# Patient Record
Sex: MALE | Race: WHITE | URBAN - METROPOLITAN AREA
[De-identification: names, ages, dates, MRNs, and addresses within clinical notes are randomized per-mention and may not be internally consistent; named-entity substitution may affect disease eponyms.]

---

## 2017-10-09 NOTE — TELEPHONE ENCOUNTER
APPT INFORMATION    Date & Time:  11/10/17   Reason for Appt:  sig health    Referring Name/Clinic:  none    Yes / No COMMENT / NOTES DATE & ACTION   Patient Contacted?  no         RECORDS CLINIC NAME DATE & ACTION RECEIVED RECS & IMG? Y/N   External Clinics:  [facility] [place]  faxed request

## 2017-10-19 NOTE — TELEPHONE ENCOUNTER
Note:    Records received from [facility], will forward to Ravinder.

## 2017-11-02 ASSESSMENT — PATIENT HEALTH QUESTIONNAIRE - PHQ9: SUM OF ALL RESPONSES TO PHQ QUESTIONS 1-9: 2

## 2017-11-02 ASSESSMENT — ANXIETY QUESTIONNAIRES
5. BEING SO RESTLESS THAT IT IS HARD TO SIT STILL: NOT AT ALL
6. BECOMING EASILY ANNOYED OR IRRITABLE: NOT AT ALL
1. FEELING NERVOUS, ANXIOUS, OR ON EDGE: NOT AT ALL
2. NOT BEING ABLE TO STOP OR CONTROL WORRYING: NOT AT ALL
GAD7 TOTAL SCORE: 0
4. TROUBLE RELAXING: NOT AT ALL
GAD7 TOTAL SCORE: 0
7. FEELING AFRAID AS IF SOMETHING AWFUL MIGHT HAPPEN: NOT AT ALL
7. FEELING AFRAID AS IF SOMETHING AWFUL MIGHT HAPPEN: NOT AT ALL
3. WORRYING TOO MUCH ABOUT DIFFERENT THINGS: NOT AT ALL

## 2017-11-03 RX ORDER — ATORVASTATIN CALCIUM 10 MG/1
10 TABLET, FILM COATED ORAL DAILY
COMMUNITY
End: 2017-11-10

## 2017-11-03 RX ORDER — MULTIPLE VITAMINS W/ MINERALS TAB 9MG-400MCG
1 TAB ORAL DAILY
COMMUNITY

## 2017-11-03 RX ORDER — COVID-19 ANTIGEN TEST
220 KIT MISCELLANEOUS 2 TIMES DAILY WITH MEALS
COMMUNITY
End: 2017-11-10

## 2017-11-03 RX ORDER — LOSARTAN POTASSIUM 50 MG/1
50 TABLET ORAL DAILY
COMMUNITY
End: 2017-11-10

## 2017-11-03 ASSESSMENT — ANXIETY QUESTIONNAIRES: GAD7 TOTAL SCORE: 0

## 2017-11-03 ASSESSMENT — PATIENT HEALTH QUESTIONNAIRE - PHQ9: SUM OF ALL RESPONSES TO PHQ QUESTIONS 1-9: 2

## 2017-11-03 NOTE — PATIENT INSTRUCTIONS
"    Discuss with your health care provider whether a prostate screening test is right for you.  Schedule colonoscopy at age 50 (earlier if you are -American; recommendations vary) unless there is a personal or family history of early colon cancer or colon polyps or a personal history of inflammatory bowel disease or abdominal radiation therapy; alternatives to colonoscopy can be discussed with your provider.   Arrange measurement of your total cholesterol and HDL readings beginning at age 25 if you use tobacco or have high blood pressure or a family history of heart disease; otherwise, screening can begin at age 35.   Schedule a fasting blood glucose level every 3 years between ages 40 and 70; individuals with high blood pressure, abnormal cholesterol readings, or overweight should be tested at an earlier age and possibly more frequently.   Immunizations: Influenza vaccination (flu shot) is given annually; tetanus, diphtheria and pertussis (Tdap) vaccination should be given once, after which tetanus and diphtheria (Td) vaccination should be given every 10 years; zoster (shingles) vaccination should be given once after age 60; PCV13 vaccination should be administered at age 65, at least one year after PPSV23; PPSV23 should be administered at least one year after PCV 13.   Eat plenty of vegetables and fruits, avoid all consumption of \"trans-\" fats, limit consumption of salt and sodium, saturated fat, sugar and starches, consume only whole grains, in moderation, such as whole wheat pasta, brown rice, and whole-grain breads, limit red meat intake, and include monounsaturated (for example olive oil, almonds, and avocados) and omega-3 fats (for example walnuts, flaxseed, and cold water, oily fish such as salmon, light tuna, trout, and sardines) in your diet.    For bone health, eat calcium-rich foods throughout each day to achieve a total daily intake of 1000 mg of calcium; take at least 1000 international units " daily of a vitamin D3 supplement.    Aim for 150 minutes per week of exercise and activity (30 minutes, most days of the week) to help control weight and prevent disease.   Limit use of alcohol to two servings per day.   Avoid all use of tobacco.   Apply generous amounts of sunscreen and reapply frequently to reduce the likelihood of developing skin cancer.   See your dentist twice per year for examination and cleaning.   Visit your eye doctor every 1-2 years or as directed.   Arrange testing for sexually transmitted infections at least annually if you are at increased risk: HIV-infected, entering correctional facility, multiple anonymous partners, intravenous drug use, engaging in sex in conjunction with illicit drug use, or a man who has sex with men.

## 2017-11-03 NOTE — PROGRESS NOTES
"Family History:  Do you have Ashkenazi Sabianist heritage? No    Health Maintenance:  (if not already asked), Do you have a PCP? Yes  When was your last visit with your PCP? 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 Nov. 2016    Have you ever had a colonoscopy? Yes, Nov. 2016  Have you ever had any polyps removed? Yes, benign - recommended to have repeat Colonoscopy     Pre visit Preparation:     Goals for Visit:  1. Thorough physical, check PSA  2. Discussion related to concerns with L ear \"stuffiness' that has been persistent for several months, combined with 2 colds since April, sinus and green mucus, no antibiotics, he noted he is a frequent flyer.  3. Pt did not report other symptoms, however pt reported ROS questionnaire does include urinary frequency, congestion and hoarseness.    Generally in good health, positive marriage of 38 years, two children 34 year daughter and 32 year old son, in good health  [employer] for work and pleasure  Exercises 4 times per week - cardio and weights    Jennifer Conroy PhD, RN, [facility]      [facility] Visit:     Full day appointment includes laboratory panel, EKG, Vital Signs, Spirometry, Full body skin exam, body composition assessment, diet assessment, nutrition consult, eye exam, fitness evaluation with exercise physiologist, hearing test, complete physical exam with review of test results and plan of care.     As I mentioned as part of your visit we will set up a DEXA scan which will measure your body composition. We have a few questions that need to be answered before we can schedule this scan:   What is your approximate weight? Did not obtain     Have you ever had a DEXA scan within the past 2 years? Yes [place] 2016   Will you have any other imaging studies with contrast (x-ray, CT scan) within 7 days of this appointment? No     Have you had any spine or hip surgery? No     Do you take any vitamins that contain calcium or antacids with " calcium? Yes Multi vitamin    If yes, stop taking 24 hours prior to visit.       Instructions prior to appointment:   1. Fast beginning at 10 pm for lab appointment  2. If your preventive care assessment package includes a Fitness Assessment, please bring athletic shoes. Complementary [facility] fitness attire is provided and yours to keep.  3. If eye exam, eyes may be dilated, it will last 4-6 hours, may want to bring sunglasses.   4. May bring laptop or other work materials for use during downtime.     Breakfast/Lunch Provided, Special Dietary Restrictions NO    Complimentary  Parking provided. Drop off car in front of [facility], enter  Lobby and identify yourself as a Signature Patient to one of the ambassadors ([email or web address]). One of our nurses will meet you in the lobby and escort you to our clinic.     Concerns with Privacy? NO    When you enter in the lobby, identify yourself as a [facility] Patient and you will be escorted up to the clinic. If questions arise prior to your appointment please contact the clinic at [phone].    Next Appointment Scheduled? November 10, 2017    Answers for HPI/ROS submitted by the patient on 11/2/2017   If you checked off any problems, how difficult have these problems made it for you to do your work, take care of things at home, or get along with other people?: Somewhat difficult  PHQ9 TOTAL SCORE: 2  SAMANTHA 7 TOTAL SCORE: 0  General Symptoms: No  Skin Symptoms: No  HENT Symptoms: Yes  EYE SYMPTOMS: No  HEART SYMPTOMS: No  LUNG SYMPTOMS: No  INTESTINAL SYMPTOMS: No  URINARY SYMPTOMS: Yes  REPRODUCTIVE SYMPTOMS: No  SKELETAL SYMPTOMS: No  BLOOD SYMPTOMS: No  NERVOUS SYSTEM SYMPTOMS: No  MENTAL HEALTH SYMPTOMS: No  Ear pain: Yes  Ear discharge: No  Hearing loss: No  Tinnitus: No  Nosebleeds: No  Congestion: Yes  Trouble swallowing: No   Voice hoarseness: Yes  Mouth sores: No  Sore throat: No  Tooth pain: No  Gum tenderness: No  Bleeding gums: No  Change in  taste: No  Change in sense of smell: No  Dry mouth: No  Neck lump: No  Trouble holding urine or incontinence: No  Pain or burning: Yes  Trouble starting or stopping: No  Increased frequency of urination: Yes  Blood in urine: No  Decreased frequency of urination: No  Frequent nighttime urination: Yes  Flank pain: No  Difficulty emptying bladder: No

## 2017-11-10 ENCOUNTER — ALLIED HEALTH/NURSE VISIT (OUTPATIENT)
Dept: INTERNAL MEDICINE | Facility: CLINIC | Age: 62
End: 2017-11-10

## 2017-11-10 ENCOUNTER — OFFICE VISIT (OUTPATIENT)
Dept: AUDIOLOGY | Facility: CLINIC | Age: 62
End: 2017-11-10

## 2017-11-10 ENCOUNTER — OFFICE VISIT (OUTPATIENT)
Dept: INTERNAL MEDICINE | Facility: CLINIC | Age: 62
End: 2017-11-10

## 2017-11-10 ENCOUNTER — OFFICE VISIT (OUTPATIENT)
Dept: OPHTHALMOLOGY | Facility: CLINIC | Age: 62
End: 2017-11-10

## 2017-11-10 ENCOUNTER — OFFICE VISIT (OUTPATIENT)
Dept: DERMATOLOGY | Facility: CLINIC | Age: 62
End: 2017-11-10

## 2017-11-10 ENCOUNTER — PRE VISIT (OUTPATIENT)
Dept: INTERNAL MEDICINE | Facility: CLINIC | Age: 62
End: 2017-11-10

## 2017-11-10 VITALS
RESPIRATION RATE: 16 BRPM | HEIGHT: 68 IN | BODY MASS INDEX: 27.58 KG/M2 | TEMPERATURE: 98.8 F | HEART RATE: 70 BPM | WEIGHT: 182 LBS | DIASTOLIC BLOOD PRESSURE: 84 MMHG | SYSTOLIC BLOOD PRESSURE: 143 MMHG | OXYGEN SATURATION: 96 %

## 2017-11-10 DIAGNOSIS — Z11.59 NEED FOR HEPATITIS C SCREENING TEST: ICD-10-CM

## 2017-11-10 DIAGNOSIS — Z00.00 VISIT FOR PREVENTIVE HEALTH EXAMINATION: ICD-10-CM

## 2017-11-10 DIAGNOSIS — L57.0 AK (ACTINIC KERATOSIS): Primary | ICD-10-CM

## 2017-11-10 DIAGNOSIS — R97.20 ELEVATED PROSTATE SPECIFIC ANTIGEN (PSA): ICD-10-CM

## 2017-11-10 DIAGNOSIS — Z00.00 ROUTINE GENERAL MEDICAL EXAMINATION AT A HEALTH CARE FACILITY: Primary | ICD-10-CM

## 2017-11-10 DIAGNOSIS — E78.49 OTHER HYPERLIPIDEMIA: ICD-10-CM

## 2017-11-10 DIAGNOSIS — H25.13 NUCLEAR SENILE CATARACT OF BOTH EYES: ICD-10-CM

## 2017-11-10 DIAGNOSIS — Z00.00 ENCOUNTER FOR PREVENTIVE HEALTH EXAMINATION: Primary | ICD-10-CM

## 2017-11-10 DIAGNOSIS — D48.5 NEOPLASM OF UNCERTAIN BEHAVIOR OF SKIN: ICD-10-CM

## 2017-11-10 DIAGNOSIS — Z77.122 HISTORY OF EXPOSURE TO NOISE: ICD-10-CM

## 2017-11-10 DIAGNOSIS — T50.905S ADVERSE EFFECT OF DRUG, SEQUELA: ICD-10-CM

## 2017-11-10 DIAGNOSIS — Z04.9 NO DISEASE FOUND: Primary | ICD-10-CM

## 2017-11-10 DIAGNOSIS — N40.0 BENIGN PROSTATIC HYPERPLASIA, UNSPECIFIED WHETHER LOWER URINARY TRACT SYMPTOMS PRESENT: ICD-10-CM

## 2017-11-10 DIAGNOSIS — L81.4 SOLAR LENTIGINOSIS: ICD-10-CM

## 2017-11-10 DIAGNOSIS — Z01.10 ENCOUNTER FOR AUDIOLOGY EVALUATION: Primary | ICD-10-CM

## 2017-11-10 DIAGNOSIS — H93.8X2 SENSATION OF FULLNESS IN LEFT EAR: ICD-10-CM

## 2017-11-10 DIAGNOSIS — I10 BENIGN ESSENTIAL HYPERTENSION: ICD-10-CM

## 2017-11-10 DIAGNOSIS — H52.03 HYPERMETROPIA OF BOTH EYES: Primary | ICD-10-CM

## 2017-11-10 DIAGNOSIS — M85.9 LOW BONE DENSITY: ICD-10-CM

## 2017-11-10 LAB
ALBUMIN UR-MCNC: NEGATIVE MG/DL
ALP SERPL-CCNC: 64 U/L (ref 40–150)
ALT SERPL W P-5'-P-CCNC: 28 U/L (ref 0–70)
APPEARANCE UR: CLEAR
BASOPHILS # BLD AUTO: 0.1 10E9/L (ref 0–0.2)
BASOPHILS NFR BLD AUTO: 0.6 %
BILIRUB UR QL STRIP: NEGATIVE
CHOLEST SERPL-MCNC: 126 MG/DL
COLOR UR AUTO: YELLOW
CREAT SERPL-MCNC: 0.82 MG/DL (ref 0.66–1.25)
DEPRECATED CALCIDIOL+CALCIFEROL SERPL-MC: 44 UG/L (ref 20–75)
DIFFERENTIAL METHOD BLD: NORMAL
EOSINOPHIL # BLD AUTO: 0.2 10E9/L (ref 0–0.7)
EOSINOPHIL NFR BLD AUTO: 2.1 %
ERYTHROCYTE [DISTWIDTH] IN BLOOD BY AUTOMATED COUNT: 12.4 % (ref 10–15)
EXPTIME-PRE: 6.72 SEC
FEF2575-%PRED-PRE: 77 %
FEF2575-PRE: 2.03 L/SEC
FEF2575-PRED: 2.63 L/SEC
FEFMAX-%PRED-PRE: 113 %
FEFMAX-PRE: 9.54 L/SEC
FEFMAX-PRED: 8.41 L/SEC
FEV1-%PRED-PRE: 83 %
FEV1-PRE: 2.66 L
FEV1FEV6-PRE: 77 %
FEV1FEV6-PRED: 79 %
FEV1FVC-PRE: 76 %
FEV1FVC-PRED: 77 %
FIFMAX-PRE: 7.05 L/SEC
FVC-%PRED-PRE: 85 %
FVC-PRE: 3.51 L
FVC-PRED: 4.13 L
GFR SERPL CREATININE-BSD FRML MDRD: >90 ML/MIN/1.7M2
GLUCOSE SERPL-MCNC: 99 MG/DL (ref 70–99)
GLUCOSE UR STRIP-MCNC: NEGATIVE MG/DL
HCT VFR BLD AUTO: 43.4 % (ref 40–53)
HCV AB SERPL QL IA: NONREACTIVE
HDLC SERPL-MCNC: 58 MG/DL
HGB BLD-MCNC: 14.4 G/DL (ref 13.3–17.7)
HGB UR QL STRIP: NEGATIVE
HIV 1+2 AB+HIV1 P24 AG SERPL QL IA: NONREACTIVE
IMM GRANULOCYTES # BLD: 0 10E9/L (ref 0–0.4)
IMM GRANULOCYTES NFR BLD: 0.3 %
KETONES UR STRIP-MCNC: NEGATIVE MG/DL
LDLC SERPL CALC-MCNC: 54 MG/DL
LEUKOCYTE ESTERASE UR QL STRIP: NEGATIVE
LYMPHOCYTES # BLD AUTO: 1.5 10E9/L (ref 0.8–5.3)
LYMPHOCYTES NFR BLD AUTO: 16.9 %
MCH RBC QN AUTO: 31.2 PG (ref 26.5–33)
MCHC RBC AUTO-ENTMCNC: 33.2 G/DL (ref 31.5–36.5)
MCV RBC AUTO: 94 FL (ref 78–100)
MONOCYTES # BLD AUTO: 0.9 10E9/L (ref 0–1.3)
MONOCYTES NFR BLD AUTO: 9.7 %
MUCOUS THREADS #/AREA URNS LPF: PRESENT /LPF
NEUTROPHILS # BLD AUTO: 6.3 10E9/L (ref 1.6–8.3)
NEUTROPHILS NFR BLD AUTO: 70.4 %
NITRATE UR QL: NEGATIVE
NONHDLC SERPL-MCNC: 67 MG/DL
NRBC # BLD AUTO: 0 10*3/UL
NRBC BLD AUTO-RTO: 0 /100
PH UR STRIP: 5 PH (ref 5–7)
PLATELET # BLD AUTO: 281 10E9/L (ref 150–450)
POTASSIUM SERPL-SCNC: 4.3 MMOL/L (ref 3.4–5.3)
PSA SERPL-ACNC: 4.34 UG/L (ref 0–4)
RBC # BLD AUTO: 4.62 10E12/L (ref 4.4–5.9)
RBC #/AREA URNS AUTO: 1 /HPF (ref 0–2)
SOURCE: ABNORMAL
SP GR UR STRIP: 1.02 (ref 1–1.03)
TRIGL SERPL-MCNC: 66 MG/DL
TSH SERPL DL<=0.005 MIU/L-ACNC: 1.83 MU/L (ref 0.4–4)
UROBILINOGEN UR STRIP-MCNC: 0 MG/DL (ref 0–2)
WBC # BLD AUTO: 9 10E9/L (ref 4–11)
WBC #/AREA URNS AUTO: 1 /HPF (ref 0–2)

## 2017-11-10 RX ORDER — TAMSULOSIN HYDROCHLORIDE 0.4 MG/1
0.4 CAPSULE ORAL DAILY
Qty: 90 CAPSULE | Refills: 3 | Status: SHIPPED | OUTPATIENT
Start: 2017-11-10 | End: 2018-12-12

## 2017-11-10 RX ORDER — LOSARTAN POTASSIUM 50 MG/1
50 TABLET ORAL DAILY
Qty: 90 TABLET | Refills: 3 | Status: SHIPPED | OUTPATIENT
Start: 2017-11-10 | End: 2018-12-06

## 2017-11-10 RX ORDER — ATORVASTATIN CALCIUM 10 MG/1
10 TABLET, FILM COATED ORAL DAILY
Qty: 90 TABLET | Refills: 3 | Status: SHIPPED | OUTPATIENT
Start: 2017-11-10 | End: 2018-12-12

## 2017-11-10 RX ORDER — FLUOROURACIL 50 MG/G
CREAM TOPICAL
Qty: 40 G | Refills: 1 | Status: SHIPPED | OUTPATIENT
Start: 2017-11-10 | End: 2019-03-29

## 2017-11-10 ASSESSMENT — CONF VISUAL FIELD
OS_NORMAL: 1
OD_NORMAL: 1
METHOD: COUNTING FINGERS

## 2017-11-10 ASSESSMENT — SLIT LAMP EXAM - LIDS
COMMENTS: NORMAL
COMMENTS: NORMAL

## 2017-11-10 ASSESSMENT — REFRACTION_MANIFEST
OD_ADD: +2.00
OD_CYLINDER: +0.50
OD_SPHERE: +0.50
OS_CYLINDER: SPHERE
OS_SPHERE: +1.00
OS_ADD: +2.00
OD_AXIS: 180

## 2017-11-10 ASSESSMENT — VISUAL ACUITY
METHOD: SNELLEN - LINEAR
OS_SC: 20/25
OD_SC+: -2
OS_SC+: +
OD_SC: 20/20

## 2017-11-10 ASSESSMENT — TONOMETRY
OD_IOP_MMHG: 17
OS_IOP_MMHG: 20
IOP_METHOD: ICARE

## 2017-11-10 ASSESSMENT — REFRACTION_CURRENTRX
OS_BASECURVE: 8.5
OS_BRAND: 1-DAY ACUVUE MOIST
OD_BRAND: 1-DAY ACUVUE MOIST
OD_BASECURVE: 8.5
OS_DIAMETER: 14.2
OD_DIAMETER: 14.2
OD_SPHERE: +0.75
OS_SPHERE: +3.00

## 2017-11-10 ASSESSMENT — PAIN SCALES - GENERAL
PAINLEVEL: NO PAIN (0)
PAINLEVEL: NO PAIN (0)

## 2017-11-10 ASSESSMENT — EXTERNAL EXAM - RIGHT EYE: OD_EXAM: NORMAL

## 2017-11-10 ASSESSMENT — EXTERNAL EXAM - LEFT EYE: OS_EXAM: NORMAL

## 2017-11-10 ASSESSMENT — CUP TO DISC RATIO
OD_RATIO: 0.3
OS_RATIO: 0.3

## 2017-11-10 NOTE — MR AVS SNAPSHOT
After Visit Summary   11/10/2017    Driss Dillon    MRN: [id number]           Patient Information     Date Of Birth          1955        Visit Information        Provider Department      11/10/2017 8:40 AM Tommy Chamberlain, [facility]        Today's Diagnoses     Hypermetropia of both eyes    -  1    Nuclear senile cataract of both eyes           Follow-ups after your visit        Follow-up notes from your care team     Return in about 1 year (around 11/10/2018) for Comprehensive Eye Exam.      Future tests that were ordered for you today     Open Future Orders        Priority Expected Expires Ordered    Prostate spec antigen screen Routine 11/24/2017 1/10/2018 11/10/2017    UROLOGY ADULT REFERRAL Routine 12/10/2017 12/10/2017 11/10/2017            Who to contact     Please call your clinic at [phone] to:    Ask questions about your health    Make or cancel appointments    Discuss your medicines    Learn about your test results    Speak to your doctor   If you have compliments or concerns about an experience at your clinic, or if you wish to file a complaint, please contact [facility] Patient Relations at [phone] or email us at [email or web address]         Additional Information About Your Visit        MyChart Information     [employer] gives you secure access to your electronic health record. If you see a primary care provider, you can also send messages to your care team and make appointments. If you have questions, please call your primary care clinic.  If you do not have a primary care provider, please call [phone] and they will assist you.      [employer] is an electronic gateway that provides easy, online access to your medical records. With [employer], you can request a clinic appointment, read your test results, renew a prescription or communicate with your care team.     To access your existing account, please contact your  [facility] or call [phone] for assistance.        Care EveryWhere ID     This is your Care EveryWhere ID. This could be used by other organizations to access your [facility] medical records  [id number]         Blood Pressure from Last 3 Encounters:   11/10/17 143/84    Weight from Last 3 Encounters:   11/10/17 82.6 kg (182 lb)              We Performed the Following     HC CONTACT LENS FITTING COSMETIC LVL 2 (66017.012)     REFRACTION [96464]          Today's Medication Changes          These changes are accurate as of: 11/10/17  4:29 PM.  If you have any questions, ask your nurse or doctor.               Start taking these medicines.        Dose/Directions    fluorouracil 5 % cream   Commonly known as:  EFUDEX   Used for:  AK (actinic keratosis)   Started by:  Elen Hensley MD        To forehead, ears, cheeks, nose nightly x3 weeks.   Quantity:  40 g   Refills:  1       tamsulosin 0.4 MG capsule   Commonly known as:  FLOMAX   Used for:  Benign prostatic hyperplasia, unspecified whether lower urinary tract symptoms present   Started by:  Dylan Fragoso MD        Dose:  0.4 mg   Take 1 capsule (0.4 mg) by mouth daily   Quantity:  90 capsule   Refills:  3         Stop taking these medicines if you haven't already. Please contact your care team if you have questions.     ALEVE 220 MG capsule   Generic drug:  naproxen sodium   Stopped by:  Dylan Fragoso MD                Where to get your medicines      These medications were sent to [facility] [id number] [place] [address] & [address] [address]     Phone:  [phone]     atorvastatin 10 MG tablet    losartan 50 MG tablet    tamsulosin 0.4 MG capsule         These medications were sent to [facility] [address] [facility] [place] - [address] & [facility]  [address] [place] [address] [place] [address]    Hours:  24-hours Phone:   [phone]     fluorouracil 5 % cream                Primary Care Provider    None Specified       No primary provider on file.        Equal Access to Services     TAYLOR SHARPE : Hadii nikolai bell amirah Haas, alexada nickquan, savita shukla aroldonelda, arturo jeremiahin hayaabobby kendrickcarlos carrillo clarence medina. So [facility] [phone].    ATENCIÓN: Si habla español, tiene a khoury disposición servicios gratuitos de asistencia lingüística. Llame al [phone].    We comply with applicable federal civil rights laws and [place] laws. We do not discriminate on the basis of race, color, national origin, age, disability, sex, sexual orientation, or gender identity.            Thank you!     Thank you for choosing [facility] OPHTH[facility]  for your care. Our goal is always to provide you with excellent care. Hearing back from our patients is one way we can continue to improve our services. Please take a few minutes to complete the written survey that you may receive in the mail after your visit with us. Thank you!             Your Updated Medication List - Protect others around you: Learn how to safely use, store and throw away your medicines at www.disposemymeds.org.          This list is accurate as of: 11/10/17  4:29 PM.  Always use your most recent med list.                   Brand Name Dispense Instructions for use Diagnosis    aspirin 81 MG tablet      Take 81 mg by mouth daily        atorvastatin 10 MG tablet    LIPITOR    90 tablet    Take 1 tablet (10 mg) by mouth daily    Other hyperlipidemia       fluorouracil 5 % cream    EFUDEX    40 g    To forehead, ears, cheeks, nose nightly x3 weeks.    AK (actinic keratosis)       losartan 50 MG tablet    COZAAR    90 tablet    Take 1 tablet (50 mg) by mouth daily    Benign essential hypertension       Multi-vitamin Tabs tablet      Take 1 tablet by mouth daily        tamsulosin 0.4 MG capsule    FLOMAX    90 capsule    Take 1 capsule (0.4 mg) by mouth daily    Benign prostatic hyperplasia,  unspecified whether lower urinary tract symptoms present

## 2017-11-10 NOTE — PROGRESS NOTES
Assessment/Plan  (H52.03) Hypermetropia of both eyes  (primary encounter diagnosis)  Comment: Hyperopia with presbyopia OU, noting blur with current lenses  Plan:  CONTACT LENS FITTING COSMETIC LVL 2 (95641.012), REFRACTION [[id number]]         Educated patient on condition and clinical findings. Dispensed spectacle prescription for full time wear. Educated patient on possibility of adaptation period, if symptoms do not improve return to clinic for further testing.   Dispensed trials and finalized contact lens prescription for Acuvue 1-Day Moist lenses.    (H25.13) Nuclear senile cataract of both eyes  Comment: Not visually significant.  Plan:  Monitor annually.    Contact Lens Billing  V-Code:  - Soft spherical  Final Contact Lens Rx      Brand Base Curve Diameter Sphere   Right 1-Day Acuvue Moist 8.5 14.2 +0.75   Left 1-Day Acuvue Moist 8.5 14.2 +3.00       Expiration Date:  11/11/2019    Replacement:  Daily    Wearing Schedule:  Daily wear         CL Fitting Fee: $100    These are for cosmetic contact lenses.    Encounter Diagnoses   Name Primary?     Hypermetropia of both eyes Yes     Nuclear senile cataract of both eyes         Complete documentation of historical and exam elements from today's encounter can  be found in the full encounter summary report (not reduplicated in this progress  note). I personally obtained the chief complaint(s) and history of present illness. I  confirmed and edited as necessary the review of systems, past medical/surgical  history, family history, social history, and examination findings as documented by  others; and I examined the patient myself. I personally reviewed the relevant tests,  images, and reports as documented above. I formulated and edited as necessary the  assessment and plan and discussed the findings and management plan with the  patient and family.    Tommy Chamberlain, PATRICIA, FAAO

## 2017-11-10 NOTE — PATIENT INSTRUCTIONS

## 2017-11-10 NOTE — OUTPATIENT NURSE NOTE
"   11/10/17 1106   Fitness   Current Fitness Regimen:  4x/wk of cycling at moderate and vigorous intensity for 52 min. 4x/week of upper body lifting (chest flies, rows, lat pull, shoulder press, biceps curls, triceps ext, etc)   Fitness Goals Maintain current workout regimen. Add 10-15 minutes of jogging/running once per week.   Timeline   Recommended Activity this Week Jogging/running   Recommended Minutes per Day this Week 15 Min   Recommended Number of Days this Week 1 Per Day/Per Week   Recommended Activity this Month Drop one day of cycling and add one day of jogging/running for 25-30min per week.   Recommended Duration this Month 30 Min/Hrs   Recommended Frequency this Month 1 Per Day/Per Week   Recommended Activity the Nex 3 Months Continue with 3x/wk of cycling, 4x/wk of upper body lifting, add one day of running per week. Reach that 60 minute mikala of jogging/running. Keep up the good work otherwise!   Recommended Duration the Nex 3 Months 60 Min/Hrs   Recommended Frequency the Nex 3 Months:  1 Per Day/Per Week   VO2 Max   VO2MAX:  38.2 ml/kg/min   VO2- max Percentile 70 %   Fitness Level Good    Strength    Strength (Right):  93.4 ml/kg/min    Strength (Left) 77.1 ml/kg/min     HR Zone 2: 115-130  HR Zone 3: 130-145  HR Zone 4/5: 150+    Try to push into Zone 4/5 for the \"work set\" of any intervals, recover in Zone 2. For normal biking, try to stay in Zone 2/3. Jogging/Running you'll want to push into Zone 3.    Sekou Solorzano M.S., PhDc      "

## 2017-11-10 NOTE — LETTER
Date:November 13, 2017      Patient was self referred, no letter generated. Do not send.        [facility] Information

## 2017-11-10 NOTE — NURSING NOTE
Driss Dillon comes into clinic today at the request of Dr. OPAL Fragoso Ordering Provider for EKG.    This service provided today was under the supervising provider of the day Dr. OPAL Fragoso, who was available if needed.    Ashley Chaudhary

## 2017-11-10 NOTE — LETTER
11/10/2017       RE: Driss Dillon  [address]  [place] [address]     Dear Colleague,    Thank you for referring your patient, Driss Dillon, to the [facility] D[facility] at [facility]. Please see a copy of my visit note below.    DERMATOLOGY CLINIC VISIT NOTE      CHIEF COMPLAINT:  Skin check for [facility].      DERMATOLOGY PROBLEM LIST:   1.  History of basal cell carcinoma in 1990s in [place].   2.  History of squamous cell carcinoma in 1990s in [place].   3.  History of actinic keratoses, status post Carac treatment in [place] x5 days in 2016.   4.  Actinic keratoses, beginning treatment with Efudex x3 weeks in 11/2017.   5.  Solar lentigines.   6.  Neoplasm of uncertain behavior x3 biopsied on 11/10/2017, located on the right lateral neck, left  flank superior, left flank inferior.  Superior lesion concerning for basal cell, neck lesion concerning for basal cell, inferior lesion concerning for dysplastic nevus versus malignant melanoma.      HISTORY OF PRESENT ILLNESS:  Mr. Dillon is a 62-year-old male presenting to Dermatology Clinic for skin check as part of the [facility] Program.  He is new to our clinic.  He has a past history of nonmelanoma skin cancer as noted above.  He notes that it has been approximately eighteen months since last seen by a dermatologist.  He previously had been followed in [place] for his skin.  Denies any new or changing spots today.      Patient Active Problem List   Diagnosis     AK (actinic keratosis)       No Known Allergies      Current Outpatient Prescriptions   Medication     fluorouracil (EFUDEX) 5 % cream     multivitamin, therapeutic with minerals (MULTI-VITAMIN) TABS tablet     tamsulosin (FLOMAX) 80 mcg/mL SUSP     aspirin 81 MG tablet     atorvastatin (LIPITOR) 10 MG tablet     losartan (COZAAR) 50 MG tablet     naproxen sodium (ALEVE) 220 MG capsule     No current facility-administered medications  for this visit.            SOCIAL HISTORY:  Lives with his wife in [place].  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      FAMILY HISTORY:  Mother with nonmelanoma skin cancer.      REVIEW OF SYSTEMS:  The patient feels well without additional skin concerns today.      PHYSICAL EXAMINATION:   There were no vitals taken for this visit.    GENERAL:  The patient is a healthy-appearing 62-year-old male in no distress.   HEENT:  Conjunctivae are clear.   PULMONARY:  Breathing comfortably on room air.   ABDOMEN:  No abdominal distention.   SKIN:  Examination today included the scalp, face, neck, chest, abdomen, back, arms, legs, hands, feet, buttocks.  Skin exam was normal except for as follows:   -- Scalp with scattered erosions, 1 mm with yellow crusting.   -- Examination of the forehead, lateral cheeks, superior helices, nasal dorsum and central cheeks with greater than 20 gritty papules.   -- Examination of the left superior flank with an 8 mm, pink, very slightly raised glassy papule.   -- Atrophic, pink, triangular macule on right lateral neck.   -- Blue, black 3 mm macule on left flank inferior.   -- Extensive light brown, 1-3 mm macules across the shoulders, upper and lower back, chest, abdomen, legs.   -- Mild xerosis of arms and legs.   -- Surgical scar on right upper forehead.      PROCEDURE NOTE:  The patient was consented to 2 shave biopsies on the right lateral neck and left flank superior and 1 punch biopsy on the left flank inferior.  Areas were infiltrated with 3 mL of lidocaine with epinephrine.  A Wyatt blade was used to perform shave biopsies on the right neck and left superior flank and a 4 mm punch biopsy performed on the left flank inferior.  A single 4-0 Prolene suture placed in punch biopsy site.  Aluminum chloride, petrolatum and bandage applied to all shave biopsies sites.  Wound care instruction provided.      ASSESSMENT AND PLAN:     1.  Past history of nonmelanoma skin cancer.  No evidence of  recurrence today.  Recommended yearly skin check and ongoing sun protection.     2.  Solar lentigines, marker of past solar injury.  Sun protection ongoing.     3.  Diffuse actinic keratoses across the face.  I recommended us of Efudex to the whole face nightly x3 weeks.  The patient will determine when he would like to complete this course.  Prescription sent.     4.  Neoplasms of uncertain behavior on right lateral neck, left flank superior and left flank inferior.  Concern for BCC of flank superior and neck and of dysplastic nevus versus melanoma versus blue nevus on the left flank inferior.  Biopsies were performed today.  I will contact the patient with the results.      The patient to return in 1 year's time or sooner pending biopsy results.     Elen Hensley MD  Dermatology Staff                      Again, thank you for allowing me to participate in the care of your patient.      Sincerely,    Elen Hensley MD

## 2017-11-10 NOTE — LETTER
"11/10/2017      RE: Driss Dillon  [address] [facility] [address]  [facility] [address]       Family History:  Do you have Ashkenazi Adventism heritage? No    Health Maintenance:  (if not already asked), Do you have a PCP? Yes  When was your last visit with your PCP? [place] last full physical executive health   When was your last eye exam? Nov. 2016    Have you ever had a colonoscopy? Yes, Nov. 2016  Have you ever had any polyps removed? Yes, benign - recommended to have repeat Colonoscopy     Pre visit Preparation:     Goals for Visit:  1. Thorough physical, check PSA  2. Discussion related to concerns with L ear \"stuffiness' that has been persistent for several months, combined with 2 colds since April, sinus and green mucus, no antibiotics, he noted he is a frequent flyer.  3. Pt did not report other symptoms, however pt reported ROS questionnaire does include urinary frequency, congestion and hoarseness.    Generally in good health, positive marriage of 38 years, two children 34 year daughter and 32 year old son, in good health  [employer] for work and pleasure  Exercises 4 times per week - cardio and weights    Jennifer Conroy PhD, RN, [facility] Health Visit:     Full day appointment includes laboratory panel, EKG, Vital Signs, Spirometry, Full body skin exam, body composition assessment, diet assessment, nutrition consult, eye exam, fitness evaluation with exercise physiologist, hearing test, complete physical exam with review of test results and plan of care.     As I mentioned as part of your visit we will set up a DEXA scan which will measure your body composition. We have a few questions that need to be answered before we can schedule this scan:   What is your approximate weight? Did not obtain     Have you ever had a DEXA scan within the past 2 years? Yes Shawn 2016   Will you have any other imaging studies with contrast (x-ray, CT scan) within 7 days of this appointment? No     Have you had any spine or " hip surgery? No     Do you take any vitamins that contain calcium or antacids with calcium? Yes Multi vitamin    If yes, stop taking 24 hours prior to visit.       Instructions prior to appointment:   1. Fast beginning at 10 pm for lab appointment  2. If your preventive care assessment package includes a Fitness Assessment, please bring athletic shoes. Complementary [facility] fitness attire is provided and yours to keep.  3. If eye exam, eyes may be dilated, it will last 4-6 hours, may want to bring sunglasses.   4. May bring laptop or other work materials for use during downtime.     Breakfast/Lunch Provided, Special Dietary Restrictions NO    Complimentary  Parking provided. Drop off car in front of [facility], enter  Lobby and identify yourself as a Signature Patient to one of the ambassadors ([email or web address]). One of our nurses will meet you in the lobby and escort you to our clinic.     Concerns with Privacy? NO    When you enter in the lobby, identify yourself as a [facility] Patient and you will be escorted up to the clinic. If questions arise prior to your appointment please contact the clinic at [phone].    Next Appointment Scheduled? November 10, 2017    Answers for HPI/ROS submitted by the patient on 11/2/2017   If you checked off any problems, how difficult have these problems made it for you to do your work, take care of things at home, or get along with other people?: Somewhat difficult  PHQ9 TOTAL SCORE: 2  SAMANTHA 7 TOTAL SCORE: 0  General Symptoms: No  Skin Symptoms: No  HENT Symptoms: Yes  EYE SYMPTOMS: No  HEART SYMPTOMS: No  LUNG SYMPTOMS: No  INTESTINAL SYMPTOMS: No  URINARY SYMPTOMS: Yes  REPRODUCTIVE SYMPTOMS: No  SKELETAL SYMPTOMS: No  BLOOD SYMPTOMS: No  NERVOUS SYSTEM SYMPTOMS: No  MENTAL HEALTH SYMPTOMS: No  Ear pain: Yes  Ear discharge: No  Hearing loss: No  Tinnitus: No  Nosebleeds: No  Congestion: Yes  Trouble swallowing: No   Voice hoarseness: Yes  Mouth sores: No  Sore  throat: No  Tooth pain: No  Gum tenderness: No  Bleeding gums: No  Change in taste: No  Change in sense of smell: No  Dry mouth: No  Neck lump: No  Trouble holding urine or incontinence: No  Pain or burning: Yes  Trouble starting or stopping: No  Increased frequency of urination: Yes  Blood in urine: No  Decreased frequency of urination: No  Frequent nighttime urination: Yes  Flank pain: No  Difficulty emptying bladder: No       History and Physical Examination     SUBJECTIVE: Chief complaint: preventive health review.     Past Medical History:  1.  History of non-melanoma skin cancer (basal cell carcinoma and squamous cell carcinoma)  2.  Status post tonsillectomy  3.  Dyslipidemia  4.  Hypertension  5.  History of colonic polyps, presumably adenomatous, details not available  6.  History of abnormal CT coronary calcium scan (composite score 27.6; 45th percentile for cohort)  7.  BPH with history of transiently elevated PSA by report     Adverse Drug Reactions: None.     Current Medications:  Tamsulosin, 0.4 mg daily  Losartan, 50 mg daily  Atorvastatin, 10 mg daily  Daily multivitamin supplement without iron  Aspirin, 81 mg daily  Naproxen, 220 mg twice a day     Habits:  Tobacco: Never.  Alcohol: 2 servings of wine per day  Caffeine: 5-6 servings of coffee per day     Social History:  to Zelda for 38 years; they are the parents of 2 children: daughter Tamy, age 35, and [employer] graduate and mother of 2 children who lives in [place], [place] with her Rastafari  ; and son Kristofer, age 33, a father of 2 children and [employer] graduate who played varsity football, attended the [employer], and works as a  in [place], [place].  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  He enjoys flying and visiting family members and his winter home in the [facility] [place].  He rides a stationary bike and engages in moderate strength training 4 days per week, typically for approximately 50 minutes.     Family History: Father  from complications of colon cancer at age 84 (diagnosed at age 80), with history of ulcerative colitis and prostate cancer.  Mother has a history of non-melanoma skin cancer is in good health at age 91.  A sister is in good health at age 61.  Children are in good health.  All grandparents  at advanced ages; maternal grandfather had dementia.  A maternal uncle had melanoma.     Review of Systems: Occasional left ear fullness, improved when in the right lateral decubitus position; symptoms can be associated with sinus drainage that responds to use of decongestants or antihistamines.  Occasional mild difficulty returning to sleep without initial insomnia.  He notes occasional mild daytime somnolence, with no known history of snoring or apnea; he attributes his intermittent drowsiness to occasional inadequate amounts of sleep related to the demands of his work and travel schedules.  Most recent colonoscopy was completed in ; he was advised at that time to schedule his next procedure in 2019.  Most recent tetanus (Tdap) was administered in 2016.  Influenza vaccination was administered earlier this season.  No history of zoster vaccination.  Remainder of complete review of systems was negative.    OBJECTIVE:     Vital signs: Height 67.5 inches.  Weight 182 pounds.  Blood pressure 141/87 on average of 3 automated readings.  Heart rate 70.  Respiratory rate 16.  Temperature 98.8 degrees.  O2 saturation 96% on room air.  General: Alert, neatly dressed and groomed, in no acute distress.  HEENT: Atraumatic and normocephalic. Eyelids, pupils, and conjunctivae appeared normal. Lips, teeth and gums appear normal.  Oropharynx " showed moist mucous membranes, without exudate or erythema.  Neck: Supple, without thyromegaly, mass, or bruit. No cervical or supraclavicular lymphadenopathy.  Back: No spinal or costovertebral angle tenderness.  Chest: Clear to auscultation and percussion. Normal respiratory effort.  Cardiovascular: No jugular venous distention. Regular rate and rhythm, normal S1, S2 without murmur.  Abdomen: Bowel sounds positive; soft, nontender, without rebound, guarding, hepatosplenomegaly or mass.  Extremities: No cyanosis or edema.  Genitalia: Normal male genitalia, without scrotal mass or hernia. No inguinal lymphadenopathy.  Rectal: Normal tone, with nontender, moderately enlarged prostate; smooth surface on right and mildly irregular surface on left. No rectal mass.  Skin: Examination was deferred; dermatology evaluation was completed earlier in day.  Neurologic: Cranial nerves II-XII were grossly intact. Sensory and motor examinations were normal. Normal gait.  Psychiatric: Alert and oriented ×3. Normal affect. Judgment and insight intact.    Creatinine 0.82, potassium 4.3, alkaline phosphatase 64, ALT 28, cholesterol 126, HDL 58, LDL 54, triglycerides 66, cholesterol/HDL 2.2, glucose 99, TSH 1.83, PSA 4.34, 25-hydroxy vitamin D 44, white blood cell count 9000, hemoglobin 14.4, platelets 281,000, HIV nonreactive, urinalysis unremarkable, hepatitis C antibody screen result pending.    EKG was unremarkable.  Spirometry showed an FEV1 of 2.66, with an FVC of 3.51; readings were 83% and 85% of predicted values, respectively.    DEXA scan showed low bone density, with most negative and valid T-score of -1.9 at the level of the right femoral neck and right total hip.  Body composition showed 27.4% fat (70th percentile); body mass index was 28.1.    Audiology evaluation showed normal hearing.  Dermatology evaluation identified several suspicious lesions that were biopsied; pathology reports pending.     ASSESSMENT:    1.   Elevated PSA.  Associated with genitourinary symptoms; family history of prostate cancer and personal history of transiently elevated PSA.  I recommended that he arrange measurement of PSA after at least 1 week and that he arrange urology consultation in the near future.  We discussed the potential causes of elevated PSA and I answered his questions regarding screening for and management of prostate cancer.    2.  Low bone density.  Current findings are similar to those noted in 8/2016 at [facility].  With acceptable 25-hydroxy vitamin D level, I will add a testosterone level to current laboratory tests.  We will determine need for further evaluation based on this result.  I emphasized the importance of adequate vitamin D3 and dietary calcium intake.  He was advised to begin daily use of a 1000- to 2000 with an international unit vitamin D3 supplement, while maintaining a daily dietary calcium intake of 1200 mg.    3.  Hypertension.  Blood pressure is slightly above ideal but acceptable given age; he was advised to monitor his blood pressure and continue current treatment.    4.  Dyslipidemia.  Using AHA/ACC guidelines, his estimated 10-year risk of a vascular event is 9.8%.  He was advised to continue his use of atorvastatin and aspirin, while striving to reduce weight, increase his levels of exercise, and follow a modified diet.    5.  History of colonic polyps.  Next colonoscopy will be due in 2019.    6.  Daytime somnolence.  He reports that this is mild, intermittent, and associated with reduced sleep hours.  Nevertheless, I recommended that he ask his wife to monitor his breathing during sleep; he agrees to arrange sleep clinic consultation in the event that she observes pauses, gasping, or loud snoring.    7.  Impaired fasting glucose.  Current glucose level was acceptable.  We discussed the importance of weight loss, continued regular exercise, and modification of diet.  He will be advised of usual  guidelines regarding follow-up testing.    8.  Preventive care.  Zoster vaccination was recommended once new preparation becomes available next month.  Colonoscopy will be due in 2019.  With a goal of reducing likelihood of adverse effects, he was advised to avoid use of NSAIDs substituting acetaminophen as long as he does not consume more than 2 servings of alcohol per day; in addition, he was advised to use antihistamines rather than decongestants as needed for sinus symptoms.    PLAN: See above.     Please note that the above medical document was created with use of speech recognition software and may contain typographical errors.      Dylan Fragoso MD

## 2017-11-10 NOTE — PROGRESS NOTES
HPI       Driss Dillon is a 62 year old man who presents for fitness evaluation.  No chief complaint on file.           Review of Systems:   ROS            Physical Exam:   There were no vitals filed for this visit.  There is no height or weight on file to calculate BMI.    Physical Exam      Results:         Assessment and Plan     There are no discontinued medications.    Options for treatment and follow-up care were reviewed with the patient. Driss Dillon  engaged in the decision making process and verbalized understanding of the options discussed and agreed with the final plan.    [facility] FITNESS    Sekou Solorzano M.S., PhDc

## 2017-11-10 NOTE — MR AVS SNAPSHOT
After Visit Summary   11/10/2017    Driss Dillon    MRN: [id number]           Patient Information     Date Of Birth          1955        Visit Information        Provider Department      11/10/2017 1:30 PM Leonie Malin AuD M [facility]        Today's Diagnoses     Encounter for audiology evaluation    -  1    Sensation of fullness in left ear        History of exposure to noise           Follow-ups after your visit        Your next 10 appointments already scheduled     Nov 10, 2017  1:00 PM CST   PFT VISIT with  TRACIE JANSEN   [facility] Testing ([facility])    [address] [place] [address]  [employer] [id number] [phone]            Nov 10, 2017  1:30 PM CST   (Arrive by 1:15 PM)   Signature Base Assessment with Belkys Santoro [facility] ([facility])    [address] [place] [address]  [employer] [employer] [id number] [phone]            Nov 10, 2017  2:00 PM CST   (Arrive by 1:45 PM)   Signature Base Assessment with Dylan Fragoso MD   [facility] ([facility])    [address] [place] [address]  [employer] [employer] [address] [phone]              Future tests that were ordered for you today     Open Future Orders        Priority Expected Expires Ordered    EKG 12-lead, tracing only Routine  11/3/2018 11/3/2017            Who to contact     Please call your clinic at [phone] to:    Ask questions about your health    Make or cancel appointments    Discuss your medicines    Learn about your test results    Speak to your doctor   If you have compliments or concerns about an experience at your clinic, or if you wish to file a complaint, please contact [facility] Patient Relations at [phone] or email us at [email or web address]         Additional Information About Your Visit         [employer] Information     [employer] gives you secure access to your electronic health record. If you see a primary care provider, you can also send messages to your care team and make appointments. If you have questions, please call your primary care clinic.  If you do not have a primary care provider, please call [phone] and they will assist you.      [employer] is an electronic gateway that provides easy, online access to your medical records. With [employer], you can request a clinic appointment, read your test results, renew a prescription or communicate with your care team.     To access your existing account, please contact your [facility] or call [phone] for assistance.        Care EveryWhere ID     This is your Care EveryWhere ID. This could be used by other organizations to access your [facility] medical records  [id number]         Blood Pressure from Last 3 Encounters:   No data found for BP    Weight from Last 3 Encounters:   No data found for Wt              We Performed the Following     AUDIOGRAM/TYMPANOGRAM - INTERFACE     [facility] Eval & Speech Recog (71429)     Tymps / Reflex   (28861)          Today's Medication Changes          These changes are accurate as of: 11/10/17 12:28 PM.  If you have any questions, ask your nurse or doctor.               Start taking these medicines.        Dose/Directions    fluorouracil 5 % cream   Commonly known as:  EFUDEX   Used for:  AK (actinic keratosis)   Started by:  Elen Hensley MD        To forehead, ears, cheeks, nose nightly x3 weeks.   Quantity:  40 g   Refills:  1            Where to get your medicines      These medications were sent to [facility] [id number] [place] [address] [address] & [facility]  [address] [place] [address] [place] [address]    Hours:  24-hours Phone:  [phone]     fluorouracil 5 % cream                Primary Care Provider    None Specified       No primary  provider on file.        Equal Access to Services     BISI SHARPE : Hadii aad ku hadidaniatravis Haas, wadevonda apurvaadaha, qaybta kaalmaarturo hall. So [facility] [phone].    ATENCIÓN: Si habla español, tiene a khoury disposición servicios gratuitos de asistencia lingüística. Llame al [phone].    We comply with applicable federal civil rights laws and [place] laws. We do not discriminate on the basis of race, color, national origin, age, disability, sex, sexual orientation, or gender identity.            Thank you!     Thank you for choosing [facility]  for your care. Our goal is always to provide you with excellent care. Hearing back from our patients is one way we can continue to improve our services. Please take a few minutes to complete the written survey that you may receive in the mail after your visit with us. Thank you!             Your Updated Medication List - Protect others around you: Learn how to safely use, store and throw away your medicines at www.disposemymeds.org.          This list is accurate as of: 11/10/17 12:28 PM.  Always use your most recent med list.                   Brand Name Dispense Instructions for use Diagnosis    ALEVE 220 MG capsule   Generic drug:  naproxen sodium      Take 220 mg by mouth 2 times daily (with meals)        aspirin 81 MG tablet      Take 81 mg by mouth daily        atorvastatin 10 MG tablet    LIPITOR     Take 10 mg by mouth daily        fluorouracil 5 % cream    EFUDEX    40 g    To forehead, ears, cheeks, nose nightly x3 weeks.    AK (actinic keratosis)       losartan 50 MG tablet    COZAAR     Take 50 mg by mouth daily        Multi-vitamin Tabs tablet      Take 1 tablet by mouth daily        tamsulosin 80 mcg/mL Susp    FLOMAX     Take by mouth daily

## 2017-11-10 NOTE — PROGRESS NOTES
AUDIOLOGY REPORT  [facility] Base Assessment    SUMMARY: Audiology visit completed. See audiogram for results.      RECOMMENDATIONS: Follow-up with Dr. Fragoso.  Consider a follow-up with ENT regarding sensation of fullness in left ear. Continue use with hearing protection for noise exposure. Repeat hearing evaluation as medically indicated, sooner if concerns arise.    Belkys Santoro  Audiologist  [place] License  #[id number]

## 2017-11-10 NOTE — PROGRESS NOTES
DERMATOLOGY CLINIC VISIT NOTE      CHIEF COMPLAINT:  Skin check for [facility].      DERMATOLOGY PROBLEM LIST:   1.  History of basal cell carcinoma in 1990s in [place].   2.  History of squamous cell carcinoma in 1990s in [place].   3.  History of actinic keratoses, status post Carac treatment in [place] x5 days in 2016.   4.  Actinic keratoses, beginning treatment with Efudex x3 weeks in 11/2017.   5.  Solar lentigines.   6.  Neoplasm of uncertain behavior x3 biopsied on 11/10/2017, located on the right lateral neck, left  flank superior, left flank inferior.  Superior lesion concerning for basal cell, neck lesion concerning for basal cell, inferior lesion concerning for dysplastic nevus versus malignant melanoma.      HISTORY OF PRESENT ILLNESS:  Mr. Dillon is a 62-year-old male presenting to Dermatology Clinic for skin check as part of the [facility] Program.  He is new to our clinic.  He has a past history of nonmelanoma skin cancer as noted above.  He notes that it has been approximately eighteen months since last seen by a dermatologist.  He previously had been followed in [place] for his skin.  Denies any new or changing spots today.      Patient Active Problem List   Diagnosis     AK (actinic keratosis)       No Known Allergies      Current Outpatient Prescriptions   Medication     fluorouracil (EFUDEX) 5 % cream     multivitamin, therapeutic with minerals (MULTI-VITAMIN) TABS tablet     tamsulosin (FLOMAX) 80 mcg/mL SUSP     aspirin 81 MG tablet     atorvastatin (LIPITOR) 10 MG tablet     losartan (COZAAR) 50 MG tablet     naproxen sodium (ALEVE) 220 MG capsule     No current facility-administered medications for this visit.            SOCIAL HISTORY:  Lives with his wife in [place].  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      FAMILY HISTORY:  Mother with nonmelanoma skin cancer.      REVIEW OF SYSTEMS:  The patient feels well without additional skin concerns today.      PHYSICAL EXAMINATION:    There were no vitals taken for this visit.    GENERAL:  The patient is a healthy-appearing 62-year-old male in no distress.   HEENT:  Conjunctivae are clear.   PULMONARY:  Breathing comfortably on room air.   ABDOMEN:  No abdominal distention.   SKIN:  Examination today included the scalp, face, neck, chest, abdomen, back, arms, legs, hands, feet, buttocks.  Skin exam was normal except for as follows:   -- Scalp with scattered erosions, 1 mm with yellow crusting.   -- Examination of the forehead, lateral cheeks, superior helices, nasal dorsum and central cheeks with greater than 20 gritty papules.   -- Examination of the left superior flank with an 8 mm, pink, very slightly raised glassy papule.   -- Atrophic, pink, triangular macule on right lateral neck.   -- Blue, black 3 mm macule on left flank inferior.   -- Extensive light brown, 1-3 mm macules across the shoulders, upper and lower back, chest, abdomen, legs.   -- Mild xerosis of arms and legs.   -- Surgical scar on right upper forehead.      PROCEDURE NOTE:  The patient was consented to 2 shave biopsies on the right lateral neck and left flank superior and 1 punch biopsy on the left flank inferior.  Areas were infiltrated with 3 mL of lidocaine with epinephrine.  A Jaci blade was used to perform shave biopsies on the right neck and left superior flank and a 4 mm punch biopsy performed on the left flank inferior.  A single 4-0 Prolene suture placed in punch biopsy site.  Aluminum chloride, petrolatum and bandage applied to all shave biopsies sites.  Wound care instruction provided.      ASSESSMENT AND PLAN:     1.  Past history of nonmelanoma skin cancer.  No evidence of recurrence today.  Recommended yearly skin check and ongoing sun protection.     2.  Solar lentigines, marker of past solar injury.  Sun protection ongoing.     3.  Diffuse actinic keratoses across the face.  I recommended us of Efudex to the whole face nightly x3 weeks.  The patient  will determine when he would like to complete this course.  Prescription sent.     4.  Neoplasms of uncertain behavior on right lateral neck, left flank superior and left flank inferior.  Concern for BCC of flank superior and neck and of dysplastic nevus versus melanoma versus blue nevus on the left flank inferior.  Biopsies were performed today.  I will contact the patient with the results.      The patient to return in 1 year's time or sooner pending biopsy results.     Elen Hensley MD  Dermatology Staff

## 2017-11-10 NOTE — LETTER
"11/10/2017       RE: Driss Dillon  [address]  [address]  [place] [address]     Dear Colleague,    Thank you for referring your patient, Driss Dillon, to the [facility] at [facility]. Please see a copy of my visit note below.    Family History:  Do you have Ashkenazi Hoahaoism heritage? No    Health Maintenance:  (if not already asked), Do you have a PCP? Yes  When was your last visit with your PCP? [place] last full physical executive health   When was your last eye exam? Nov. 2016    Have you ever had a colonoscopy? Yes, Nov. 2016  Have you ever had any polyps removed? Yes, benign - recommended to have repeat Colonoscopy     Pre visit Preparation:     Goals for Visit:  1. Thorough physical, check PSA  2. Discussion related to concerns with L ear \"stuffiness' that has been persistent for several months, combined with 2 colds since April, sinus and green mucus, no antibiotics, he noted he is a frequent flyer.  3. Pt did not report other symptoms, however pt reported ROS questionnaire does include urinary frequency, congestion and hoarseness.    Generally in good health, positive marriage of 38 years, two children 34 year daughter and 32 year old son, in good health  [employer] for work and pleasure  Exercises 4 times per week - cardio and weights    Jennifer Conroy PhD, RN, [facility] Visit:     Full day appointment includes laboratory panel, EKG, Vital Signs, Spirometry, Full body skin exam, body composition assessment, diet assessment, nutrition consult, eye exam, fitness evaluation with exercise physiologist, hearing test, complete physical exam with review of test results and plan of care.     As I mentioned as part of your visit we will set up a DEXA scan which will measure your body composition. We have a few questions that need to be answered before we can schedule this scan:   What is your approximate weight? Did not " obtain     Have you ever had a DEXA scan within the past 2 years? Yes [place] 2016   Will you have any other imaging studies with contrast (x-ray, CT scan) within 7 days of this appointment? No     Have you had any spine or hip surgery? No     Do you take any vitamins that contain calcium or antacids with calcium? Yes Multi vitamin    If yes, stop taking 24 hours prior to visit.       Instructions prior to appointment:   1. Fast beginning at 10 pm for lab appointment  2. If your preventive care assessment package includes a Fitness Assessment, please bring athletic shoes. Complementary [facility] fitness attire is provided and yours to keep.  3. If eye exam, eyes may be dilated, it will last 4-6 hours, may want to bring sunglasses.   4. May bring laptop or other work materials for use during downtime.     Breakfast/Lunch Provided, Special Dietary Restrictions NO    Complimentary  Parking provided. Drop off car in front of [facility], enter  Lobby and identify yourself as a Signature Patient to one of the ambassadors ([email or web address]). One of our nurses will meet you in the lobby and escort you to our clinic.     Concerns with Privacy? NO    When you enter in the lobby, identify yourself as a [facility] Patient and you will be escorted up to the clinic. If questions arise prior to your appointment please contact the clinic at [phone].    Next Appointment Scheduled? November 10, 2017    Answers for HPI/ROS submitted by the patient on 11/2/2017   If you checked off any problems, how difficult have these problems made it for you to do your work, take care of things at home, or get along with other people?: Somewhat difficult  PHQ9 TOTAL SCORE: 2  SAMANTHA 7 TOTAL SCORE: 0  General Symptoms: No  Skin Symptoms: No  HENT Symptoms: Yes  EYE SYMPTOMS: No  HEART SYMPTOMS: No  LUNG SYMPTOMS: No  INTESTINAL SYMPTOMS: No  URINARY SYMPTOMS: Yes  REPRODUCTIVE SYMPTOMS: No  SKELETAL SYMPTOMS: No  BLOOD SYMPTOMS:  No  NERVOUS SYSTEM SYMPTOMS: No  MENTAL HEALTH SYMPTOMS: No  Ear pain: Yes  Ear discharge: No  Hearing loss: No  Tinnitus: No  Nosebleeds: No  Congestion: Yes  Trouble swallowing: No   Voice hoarseness: Yes  Mouth sores: No  Sore throat: No  Tooth pain: No  Gum tenderness: No  Bleeding gums: No  Change in taste: No  Change in sense of smell: No  Dry mouth: No  Neck lump: No  Trouble holding urine or incontinence: No  Pain or burning: Yes  Trouble starting or stopping: No  Increased frequency of urination: Yes  Blood in urine: No  Decreased frequency of urination: No  Frequent nighttime urination: Yes  Flank pain: No  Difficulty emptying bladder: No       History and Physical Examination     SUBJECTIVE: Chief complaint: preventive health review.     Past Medical History:  1.  History of non-melanoma skin cancer (basal cell carcinoma and squamous cell carcinoma)  2.  Status post tonsillectomy  3.  Dyslipidemia  4.  Hypertension  5.  History of colonic polyps, presumably adenomatous, details not available  6.  History of abnormal CT coronary calcium scan (composite score 27.6; 45th percentile for cohort)  7.  BPH with history of transiently elevated PSA by report     Adverse Drug Reactions: None.     Current Medications:  Tamsulosin, 0.4 mg daily  Losartan, 50 mg daily  Atorvastatin, 10 mg daily  Daily multivitamin supplement without iron  Aspirin, 81 mg daily  Naproxen, 220 mg twice a day     Habits:  Tobacco: Never.  Alcohol: 2 servings of wine per day  Caffeine: 5-6 servings of coffee per day     Social History:  to Zelda for 38 years; they are the parents of 2 children: daughter Tamy, age 35, and [employer] graduate and mother of 2 children who lives in [place], [place] with her Sikhism  ; and son Kristofer, age 33, a father of 2 children and [employer] graduate who played varsity football, attended the [employer], and works as a  in [place], [place].  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  He enjoys flying and visiting family members and his winter home in the [place].  He rides a stationary bike and engages in moderate strength training 4 days per week, typically for approximately 50 minutes.     Family History: Father  from complications of colon cancer at age 84 (diagnosed at age 80), with history of ulcerative colitis and prostate cancer.  Mother has a history of non-melanoma skin cancer is in good health at age 91.  A sister is in good health at age 61.  Children are in good health.  All grandparents  at advanced ages; maternal grandfather had dementia.  A maternal uncle had melanoma.     Review of Systems: Occasional left ear fullness, improved when in the right lateral decubitus position; symptoms can be associated with sinus drainage that responds to use of decongestants or antihistamines.  Occasional mild difficulty returning to sleep without initial insomnia.  He notes occasional mild daytime somnolence, with no known history of snoring or apnea; he attributes his intermittent drowsiness to occasional inadequate amounts of sleep related to the demands of his work and travel schedules.  Most recent colonoscopy was completed in ; he was advised at that time to schedule his next procedure in 2019.  Most recent tetanus (Tdap) was administered in 2016.  Influenza vaccination was administered earlier this season.  No history of zoster vaccination.  Remainder of complete review of systems was negative.    OBJECTIVE:     Vital signs: Height 67.5 inches.  Weight 182 pounds.  Blood pressure 141/87 on average of 3 automated readings.  Heart rate 70.  Respiratory rate 16.  Temperature 98.8 degrees.  O2 " saturation 96% on room air.  General: Alert, neatly dressed and groomed, in no acute distress.  HEENT: Atraumatic and normocephalic. Eyelids, pupils, and conjunctivae appeared normal. Lips, teeth and gums appear normal.  Oropharynx showed moist mucous membranes, without exudate or erythema.  Neck: Supple, without thyromegaly, mass, or bruit. No cervical or supraclavicular lymphadenopathy.  Back: No spinal or costovertebral angle tenderness.  Chest: Clear to auscultation and percussion. Normal respiratory effort.  Cardiovascular: No jugular venous distention. Regular rate and rhythm, normal S1, S2 without murmur.  Abdomen: Bowel sounds positive; soft, nontender, without rebound, guarding, hepatosplenomegaly or mass.  Extremities: No cyanosis or edema.  Genitalia: Normal male genitalia, without scrotal mass or hernia. No inguinal lymphadenopathy.  Rectal: Normal tone, with nontender, moderately enlarged prostate; smooth surface on right and mildly irregular surface on left. No rectal mass.  Skin: Examination was deferred; dermatology evaluation was completed earlier in day.  Neurologic: Cranial nerves II-XII were grossly intact. Sensory and motor examinations were normal. Normal gait.  Psychiatric: Alert and oriented ×3. Normal affect. Judgment and insight intact.    Creatinine 0.82, potassium 4.3, alkaline phosphatase 64, ALT 28, cholesterol 126, HDL 58, LDL 54, triglycerides 66, cholesterol/HDL 2.2, glucose 99, TSH 1.83, PSA 4.34, 25-hydroxy vitamin D 44, white blood cell count 9000, hemoglobin 14.4, platelets 281,000, HIV nonreactive, urinalysis unremarkable, hepatitis C antibody screen result pending.    EKG was unremarkable.  Spirometry showed an FEV1 of 2.66, with an FVC of 3.51; readings were 83% and 85% of predicted values, respectively.    DEXA scan showed low bone density, with most negative and valid T-score of -1.9 at the level of the right femoral neck and right total hip.  Body composition showed 27.4%  fat (70th percentile); body mass index was 28.1.    Audiology evaluation showed normal hearing.  Dermatology evaluation identified several suspicious lesions that were biopsied; pathology reports pending.     ASSESSMENT:    1.  Elevated PSA.  Associated with genitourinary symptoms; family history of prostate cancer and personal history of transiently elevated PSA.  I recommended that he arrange measurement of PSA after at least 1 week and that he arrange urology consultation in the near future.  We discussed the potential causes of elevated PSA and I answered his questions regarding screening for and management of prostate cancer.    2.  Low bone density.  Current findings are similar to those noted in 8/2016 at [facility].  With acceptable 25-hydroxy vitamin D level, I will add a testosterone level to current laboratory tests.  We will determine need for further evaluation based on this result.  I emphasized the importance of adequate vitamin D3 and dietary calcium intake.  He was advised to begin daily use of a 1000- to 2000 with an international unit vitamin D3 supplement, while maintaining a daily dietary calcium intake of 1200 mg.    3.  Hypertension.  Blood pressure is slightly above ideal but acceptable given age; he was advised to monitor his blood pressure and continue current treatment.    4.  Dyslipidemia.  Using AHA/ACC guidelines, his estimated 10-year risk of a vascular event is 9.8%.  He was advised to continue his use of atorvastatin and aspirin, while striving to reduce weight, increase his levels of exercise, and follow a modified diet.    5.  History of colonic polyps.  Next colonoscopy will be due in 2019.    6.  Daytime somnolence.  He reports that this is mild, intermittent, and associated with reduced sleep hours.  Nevertheless, I recommended that he ask his wife to monitor his breathing during sleep; he agrees to arrange sleep clinic consultation in the event that she observes pauses,  gasping, or loud snoring.    7.  Impaired fasting glucose.  Current glucose level was acceptable.  We discussed the importance of weight loss, continued regular exercise, and modification of diet.  He will be advised of usual guidelines regarding follow-up testing.    8.  Preventive care.  Zoster vaccination was recommended once new preparation becomes available next month.  Colonoscopy will be due in 2019.  With a goal of reducing likelihood of adverse effects, he was advised to avoid use of NSAIDs substituting acetaminophen as long as he does not consume more than 2 servings of alcohol per day; in addition, he was advised to use antihistamines rather than decongestants as needed for sinus symptoms.    PLAN: See above.     Please note that the above medical document was created with use of speech recognition software and may contain typographical errors.      Again, thank you for allowing me to participate in the care of your patient.      Sincerely,    Dylan Fragoso MD

## 2017-11-10 NOTE — NURSING NOTE
Chief Complaints and History of Present Illnesses   Patient presents with     Annual Eye Exam     HPI    Affected eye(s):  Both   Symptoms:     No blurred vision      Frequency:  Constant       Do you have eye pain now?:  No      Comments:  CL on left eye only. Wearing daily 16-18 hours. Does not know disposal schedule. Keeps it on until it feels gross. No sleeping, forgets once every 6 months maybe. No swimming in it. No eye pain. Uses AT PRN OU.    CL: AO +2.00 OS, 8.4, 14.0    Using OTC 2.00 which is working well.    Farzaneh Walker COT 8:54 AM November 10, 2017

## 2017-11-10 NOTE — NURSING NOTE
AHA BP    1st  138/92  2nd  142/84  3rd  143/84  Average  141/87  Ashley Chaudhary CMA 12:46 PM on 11/10/2017.

## 2017-11-10 NOTE — MR AVS SNAPSHOT
After Visit Summary   11/10/2017    Driss Dillon    MRN: [id number]           Patient Information     Date Of Birth          1955        Visit Information        Provider Department      11/10/2017 12:00 PM Andie Malik RD  [facility]        Today's Diagnoses     Routine general medical examination at a health care facility    -  1       Follow-ups after your visit        Your next 10 appointments already scheduled     Nov 10, 2017  2:00 PM CST   (Arrive by 1:45 PM)   Signature Base Assessment with Dylan Fragoso MD    [facility] ([facility])    [address] [place] [address]  [address] [facility] [address] [phone]              Who to contact     Please call your clinic at [phone] to:    Ask questions about your health    Make or cancel appointments    Discuss your medicines    Learn about your test results    Speak to your doctor   If you have compliments or concerns about an experience at your clinic, or if you wish to file a complaint, please contact [facility] Patient Relations at [phone] or email us at [email or web address]         Additional Information About Your Visit        MyChart Information     T[employer] gives you secure access to your electronic health record. If you see a primary care provider, you can also send messages to your care team and make appointments. If you have questions, please call your primary care clinic.  If you do not have a primary care provider, please call [phone] and they will assist you.      T[employer]kt is an electronic gateway that provides easy, online access to your medical records. With [employer], you can request a clinic appointment, read your test results, renew a prescription or communicate with your care team.     To access your existing account, please contact your [facility] or call  [phone] for assistance.        Care EveryWhere ID     This is your Care EveryWhere ID. This could be used by other organizations to access your [facility] medical records  [id number]         Blood Pressure from Last 3 Encounters:   No data found for BP    Weight from Last 3 Encounters:   No data found for Wt              Today, you had the following     No orders found for display         Today's Medication Changes          These changes are accurate as of: 11/10/17  1:54 PM.  If you have any questions, ask your nurse or doctor.               Start taking these medicines.        Dose/Directions    fluorouracil 5 % cream   Commonly known as:  EFUDEX   Used for:  AK (actinic keratosis)   Started by:  Elen Hensley MD        To forehead, ears, cheeks, nose nightly x3 weeks.   Quantity:  40 g   Refills:  1            Where to get your medicines      These medications were sent to [facility] [address] [place] [address] [address] AT [address] [place] [address] & [address] [address]    Hours:  24-hours Phone:  [phone]     fluorouracil 5 % cream                Primary Care Provider    None Specified       No primary provider on file.        Equal Access to Services     [facility]: Hadii nikolai bell hadstacey Sodale, waaxda luqadaha, qaybta kaalmada william, arturo lima . So [facility] [phone].    ATENCIÓN: Si habla español, tiene a khoury disposición servicios gratuitos de asistencia lingüística. Llame al [phone].    We comply with applicable federal civil rights laws and [place] laws. We do not discriminate on the basis of race, color, national origin, age, disability, sex, sexual orientation, or gender identity.            Thank you!     Thank you for choosing [facility]  for your care. Our goal is always to provide you with excellent care. Hearing back from our patients is one way we can continue to improve our services.  Please take a few minutes to complete the written survey that you may receive in the mail after your visit with us. Thank you!             Your Updated Medication List - Protect others around you: Learn how to safely use, store and throw away your medicines at www.disposemymeds.org.          This list is accurate as of: 11/10/17  1:54 PM.  Always use your most recent med list.                   Brand Name Dispense Instructions for use Diagnosis    ALEVE 220 MG capsule   Generic drug:  naproxen sodium      Take 220 mg by mouth 2 times daily (with meals)        aspirin 81 MG tablet      Take 81 mg by mouth daily        atorvastatin 10 MG tablet    LIPITOR     Take 10 mg by mouth daily        fluorouracil 5 % cream    EFUDEX    40 g    To forehead, ears, cheeks, nose nightly x3 weeks.    AK (actinic keratosis)       losartan 50 MG tablet    COZAAR     Take 50 mg by mouth daily        Multi-vitamin Tabs tablet      Take 1 tablet by mouth daily        tamsulosin 80 mcg/mL Susp    FLOMAX     Take by mouth daily

## 2017-11-10 NOTE — NURSING NOTE
Dermatology Rooming Note    Driss Dillon's goals for this visit include:   Chief Complaint   Patient presents with     Skin Check     Skin check, personal hx of skin cancer.     Kita Trent LPN

## 2017-11-10 NOTE — NURSING NOTE
Chief Complaint   Patient presents with     Physical     Patient is here for physical     Ashley Chaudhary CMA 7:31 AM on 11/10/2017.

## 2017-11-10 NOTE — LETTER
Date:November 13, 2017      Patient was self referred, no letter generated. Do not send.        [facility] Health Information

## 2017-11-10 NOTE — PROGRESS NOTES
Referring provider: Ed    Driss Dillon  is a 62 year old male presents today for new nutrition consultation.      Vitals:  There were no vitals taken for this visit.      Nutrition History  Patient is on a regular  diet at home.  Recall:  Breakfast: Fran bar, lost of coffee during the day  Lunch: Fran bar  Dinner: pasta with meat and veggies, 2 glasses of red wine ([place])  Beverages: waster, coffee, and wine  ETOH (1 drink = 12 oz beer, 5 oz wine, 1.5 oz liquor):  2 glasses of wine every day  Eating out: once a week    Time with Patient:  60 Minutes    Nutrition  DX:.   1. Routine general medical examination at a health care facility        Nutrition Goals:   1:  Exploring nutrition to increasing gut health:    Fermented foods:  yogurts, kaitlynn chi, sauerkraut, kombucha, miso, good belly.  Test new fermented foods gradually in the small quantities and build quantities gradually.     Having variety of fruits and veggies and variety in whole grains.    Testing variety in meals and also meal timing.    Visiting webpage for new nutrition information related to gut health:  [email or web address]    2:  Incorporating more anti-inflammatory foods:    Omega-3 fatty acids (salmon or tuna at least 2 times per week, add walnuts, flax seeds, luz elena seeds, hemp hearts).    Tart cherries may be useful on the weekends when you are very active and having tart cherry juice concentrate 2Tbsp before the bed (shoreline fruit)    Continue having Haitian dishes that are rich in turmeric and anti-inflammatory spices.     Exploring beet juice concentrates  (Beet It and Red Ace)    If you would like to explore connective tissue health.  This recipe is something that I have used with some of my clients:      5 packets (4g each) of Gaytan  Gelatine  ? 2 Cups of juice  ? 1,000 mg of        vitamin C.  ?Boil 1.5 cups of the juice while you dissolve the gelatin and  vitamin C in the remaining 0.5 cup. Cool off 1.5c of  gelatin mixture before  you mix it with 0.5c of juice mixture, since vitamin C is sensitive to heat.  Put in the fridge, and cut into 10 pieces. Eat one piece per day    3:  Please, do not hesitate to reach out if there are more questions or if  you decide to explore nutrition later.          Andie Malik, MS, RD, CSSD, LD  [facility]

## 2017-11-11 NOTE — PROGRESS NOTES
" History and Physical Examination     SUBJECTIVE: Chief complaint: preventive health review.     Past Medical History:  1.  History of non-melanoma skin cancer (basal cell carcinoma and squamous cell carcinoma)  2.  Status post tonsillectomy  3.  Dyslipidemia  4.  Hypertension  5.  History of colonic polyps, presumably adenomatous, details not available  6.  History of abnormal CT coronary calcium scan (composite score 27.6; 45th percentile for cohort)  7.  BPH with history of transiently elevated PSA by report     Adverse Drug Reactions: None.     Current Medications:  Tamsulosin, 0.4 mg daily  Losartan, 50 mg daily  Atorvastatin, 10 mg daily  Daily multivitamin supplement without iron  Aspirin, 81 mg daily  Naproxen, 220 mg twice a day     Habits:  Tobacco: Never.  Alcohol: 2 servings of wine per day  Caffeine: 5-6 servings of coffee per day     Social History:  to Zelda for 38 years; they are the parents of 2 children: daughter Tamy, age 35, and [employer] graduate and mother of 2 children who lives in [place], [place] with her Yarsani  ; and son Kristofer, age 33, a father of 2 children and [employer] graduate who played varsity football, attended the [employer], and works as a  in [place], [place].  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  He enjoys flying and visiting family members and his winter home in the [facility] [facility] [place].  He rides a stationary bike and engages in moderate strength training 4 days per week, typically for approximately 50 minutes.     Family History: Father  from complications of colon cancer at age 84 (diagnosed at age 80), with history of ulcerative colitis and prostate cancer.  Mother has a " history of non-melanoma skin cancer is in good health at age 91.  A sister is in good health at age 61.  Children are in good health.  All grandparents  at advanced ages; maternal grandfather had dementia.  A maternal uncle had melanoma.     Review of Systems: Occasional left ear fullness, improved when in the right lateral decubitus position; symptoms can be associated with sinus drainage that responds to use of decongestants or antihistamines.  Occasional mild difficulty returning to sleep without initial insomnia.  He notes occasional mild daytime somnolence, with no known history of snoring or apnea; he attributes his intermittent drowsiness to occasional inadequate amounts of sleep related to the demands of his work and travel schedules.  Most recent colonoscopy was completed in ; he was advised at that time to schedule his next procedure in 2019.  Most recent tetanus (Tdap) was administered in 2016.  Influenza vaccination was administered earlier this season.  No history of zoster vaccination.  Remainder of complete review of systems was negative.    OBJECTIVE:     Vital signs: Height 67.5 inches.  Weight 182 pounds.  Blood pressure 141/87 on average of 3 automated readings.  Heart rate 70.  Respiratory rate 16.  Temperature 98.8 degrees.  O2 saturation 96% on room air.  General: Alert, neatly dressed and groomed, in no acute distress.  HEENT: Atraumatic and normocephalic. Eyelids, pupils, and conjunctivae appeared normal. Lips, teeth and gums appear normal.  Oropharynx showed moist mucous membranes, without exudate or erythema.  Neck: Supple, without thyromegaly, mass, or bruit. No cervical or supraclavicular lymphadenopathy.  Back: No spinal or costovertebral angle tenderness.  Chest: Clear to auscultation and percussion. Normal respiratory effort.  Cardiovascular: No jugular venous distention. Regular rate and rhythm, normal S1, S2 without murmur.  Abdomen: Bowel sounds positive; soft, nontender,  without rebound, guarding, hepatosplenomegaly or mass.  Extremities: No cyanosis or edema.  Genitalia: Normal male genitalia, without scrotal mass or hernia. No inguinal lymphadenopathy.  Rectal: Normal tone, with nontender, moderately enlarged prostate; smooth surface on right and mildly irregular surface on left. No rectal mass.  Skin: Examination was deferred; dermatology evaluation was completed earlier in day.  Neurologic: Cranial nerves II-XII were grossly intact. Sensory and motor examinations were normal. Normal gait.  Psychiatric: Alert and oriented ×3. Normal affect. Judgment and insight intact.    Creatinine 0.82, potassium 4.3, alkaline phosphatase 64, ALT 28, cholesterol 126, HDL 58, LDL 54, triglycerides 66, cholesterol/HDL 2.2, glucose 99, TSH 1.83, PSA 4.34, 25-hydroxy vitamin D 44, white blood cell count 9000, hemoglobin 14.4, platelets 281,000, HIV nonreactive, urinalysis unremarkable, hepatitis C antibody screen result pending.    EKG was unremarkable.  Spirometry showed an FEV1 of 2.66, with an FVC of 3.51; readings were 83% and 85% of predicted values, respectively.    DEXA scan showed low bone density, with most negative and valid T-score of -1.9 at the level of the right femoral neck and right total hip.  Body composition showed 27.4% fat (70th percentile); body mass index was 28.1.    Audiology evaluation showed normal hearing.  Dermatology evaluation identified several suspicious lesions that were biopsied; pathology reports pending.     ASSESSMENT:    1.  Elevated PSA.  Associated with genitourinary symptoms; family history of prostate cancer and personal history of transiently elevated PSA.  I recommended that he arrange measurement of PSA after at least 1 week and that he arrange urology consultation in the near future.  We discussed the potential causes of elevated PSA and I answered his questions regarding screening for and management of prostate cancer.    2.  Low bone density.   Current findings are similar to those noted in 8/2016 at [facility].  With acceptable 25-hydroxy vitamin D level, I will add a testosterone level to current laboratory tests.  We will determine need for further evaluation based on this result.  I emphasized the importance of adequate vitamin D3 and dietary calcium intake.  He was advised to begin daily use of a 1000- to 2000 with an international unit vitamin D3 supplement, while maintaining a daily dietary calcium intake of 1200 mg.    3.  Hypertension.  Blood pressure is slightly above ideal but acceptable given age; he was advised to monitor his blood pressure and continue current treatment.    4.  Dyslipidemia.  Using AHA/ACC guidelines, his estimated 10-year risk of a vascular event is 9.8%.  He was advised to continue his use of atorvastatin and aspirin, while striving to reduce weight, increase his levels of exercise, and follow a modified diet.    5.  History of colonic polyps.  Next colonoscopy will be due in 2019.    6.  Daytime somnolence.  He reports that this is mild, intermittent, and associated with reduced sleep hours.  Nevertheless, I recommended that he ask his wife to monitor his breathing during sleep; he agrees to arrange sleep clinic consultation in the event that she observes pauses, gasping, or loud snoring.    7.  Impaired fasting glucose.  Current glucose level was acceptable.  We discussed the importance of weight loss, continued regular exercise, and modification of diet.  He will be advised of usual guidelines regarding follow-up testing.    8.  Preventive care.  Zoster vaccination was recommended once new preparation becomes available next month.  Colonoscopy will be due in 2019.  With a goal of reducing likelihood of adverse effects, he was advised to avoid use of NSAIDs substituting acetaminophen as long as he does not consume more than 2 servings of alcohol per day; in addition, he was advised to use antihistamines rather than  decongestants as needed for sinus symptoms.    PLAN: See above.     Please note that the above medical document was created with use of speech recognition software and may contain typographical errors.

## 2017-11-12 PROBLEM — L81.4 SOLAR LENTIGINOSIS: Status: ACTIVE | Noted: 2017-11-12

## 2017-11-12 PROBLEM — D48.5 NEOPLASM OF UNCERTAIN BEHAVIOR OF SKIN: Status: ACTIVE | Noted: 2017-11-12

## 2017-11-12 RX ORDER — LIDOCAINE HYDROCHLORIDE AND EPINEPHRINE 10; 10 MG/ML; UG/ML
3 INJECTION, SOLUTION INFILTRATION; PERINEURAL ONCE
Qty: 3 ML | Refills: 0 | OUTPATIENT
Start: 2017-11-12 | End: 2017-11-12

## 2017-11-13 LAB — COPATH REPORT: NORMAL

## 2017-11-16 ENCOUNTER — PRE VISIT (OUTPATIENT)
Dept: UROLOGY | Facility: CLINIC | Age: 62
End: 2017-11-16

## 2017-11-17 ENCOUNTER — TELEPHONE (OUTPATIENT)
Dept: DERMATOLOGY | Facility: CLINIC | Age: 62
End: 2017-11-17

## 2017-11-17 DIAGNOSIS — C44.519 BCC (BASAL CELL CARCINOMA), TRUNK: Primary | ICD-10-CM

## 2017-11-27 ENCOUNTER — TELEPHONE (OUTPATIENT)
Dept: INTERNAL MEDICINE | Facility: CLINIC | Age: 62
End: 2017-11-27

## 2017-11-27 DIAGNOSIS — R97.20 ELEVATED PROSTATE SPECIFIC ANTIGEN (PSA): ICD-10-CM

## 2017-11-27 LAB — PSA SERPL-ACNC: 2.86 UG/L (ref 0–4)

## 2017-11-27 NOTE — TELEPHONE ENCOUNTER
Results of PSA discussed.  I recommended that he keep urology consultation as scheduled in 1/2018.

## 2017-12-20 ENCOUNTER — PRE VISIT (OUTPATIENT)
Dept: UROLOGY | Facility: CLINIC | Age: 62
End: 2017-12-20

## 2018-01-06 ASSESSMENT — ENCOUNTER SYMPTOMS
DYSURIA: 1
HEMATURIA: 0
FLANK PAIN: 0
DIFFICULTY URINATING: 0

## 2018-01-09 ENCOUNTER — OFFICE VISIT (OUTPATIENT)
Dept: UROLOGY | Facility: CLINIC | Age: 63
End: 2018-01-09
Payer: COMMERCIAL

## 2018-01-09 VITALS
DIASTOLIC BLOOD PRESSURE: 92 MMHG | WEIGHT: 186.4 LBS | SYSTOLIC BLOOD PRESSURE: 154 MMHG | HEIGHT: 68 IN | BODY MASS INDEX: 28.25 KG/M2 | HEART RATE: 60 BPM

## 2018-01-09 DIAGNOSIS — N40.2 PROSTATE NODULE: ICD-10-CM

## 2018-01-09 DIAGNOSIS — N40.2 PROSTATE NODULE: Primary | ICD-10-CM

## 2018-01-09 ASSESSMENT — PAIN SCALES - GENERAL: PAINLEVEL: NO PAIN (0)

## 2018-01-09 NOTE — PROGRESS NOTES
Chief Complaint:   Elevated PSA         History of Present Illness:    Driss Dillon is a very pleasant 62 year old male who presents with a history of elevated PSA.  He does have a family history of prostate cancer in his father who was treated with prostatectomy and ultimately  of colon cancer.  PSA's have ranged between mid 2's and low 3s since .  It was 4.3 on  and was later repeated 2 weeks later and came down to 2.86.  He was working out on an exercise bike during the first elevated value.  Uses flomax for voiding which satisfactorily addresses mild LUTS.  No history of UTI, prostatitis, gross hematuria.         Past Medical History:     Past Medical History:   Diagnosis Date     Cancer (H) ,     skin cancer forehead     Colon polyps 2016    removed, benign     Hyperlipidemia      Hypertension             Past Surgical History:     Past Surgical History:   Procedure Laterality Date     COLONOSCOPY N/A 2016            Medications     Current Outpatient Prescriptions   Medication     tamsulosin (FLOMAX) 0.4 MG capsule     losartan (COZAAR) 50 MG tablet     atorvastatin (LIPITOR) 10 MG tablet     multivitamin, therapeutic with minerals (MULTI-VITAMIN) TABS tablet     aspirin 81 MG tablet     Ibuprofen (ADVIL PO)     Acetaminophen (TYLENOL PO)     fluorouracil (EFUDEX) 5 % cream     No current facility-administered medications for this visit.             Family History:     Family History   Problem Relation Age of Onset     CANCER Father      Colorectal Cancer Father      terminal     Other Cancer Father      prostate     Ulcerative Colitis Father      Skin Cancer Mother      skin cancer     Alzheimer Disease Maternal Grandfather      Glaucoma No family hx of      Macular Degeneration No family hx of             Social History:     Social History     Social History     Marital status:      Spouse name: N/A     Number of children: N/A     Years of education: N/A  "    Occupational History     Not on file.     Social History Main Topics     Smoking status: Never Smoker     Smokeless tobacco: Never Used     Alcohol use Yes      Comment: 1-2 glasses of wine daily     Drug use: No     Sexual activity: Yes     Partners: Male     Birth control/ protection: Male Surgical     Other Topics Concern     Not on file     Social History Narrative            Allergies:   Review of patient's allergies indicates no known allergies.         Review of Systems:  From intake questionnaire   Negative 14 system review except as noted on HPI, nurse's note.         Physical Exam:   Patient is a 62 year old  male   Vitals: Blood pressure (!) 154/92, pulse 60, height 1.715 m (5' 7.5\"), weight 84.6 kg (186 lb 6.4 oz).  General Appearance Adult: Alert, no acute distress, oriented  HENT: throat/mouth:normal, good dentition  Neck: No adenopathy,masses or thyromegaly  Lungs: no respiratory distress, or pursed lip breathing  Heart: No obvious jugular venous distension present  Abdomen: soft, nontender, no organomegaly or masses, Body mass index is 28.76 kg/(m^2).  Lymphatics: No cervical or supraclavicular adenopathy  Musculoskeltal: extremities normal, no peripheral edema  Skin: no suspicious lesions or rashes  Neuro: Alert, oriented, speech and mentation normal  Psych: affect and mood normal  Gait: Normal  : Normal circumcised phallus, bilateral descended testes, no masses, 40 gm prostate, left sided nodule        Labs and Pathology:    I personally reviewed all applicable laboratory data and went over findings with patient  Significant for:    CBC RESULTS:  Recent Labs   Lab Test  11/10/17   0724   WBC  9.0   HGB  14.4   PLT  281        BMP RESULTS:  Recent Labs   Lab Test  11/10/17   0724   POTASSIUM  4.3   GLC  99   CR  0.82   GFRESTIMATED  >90   GFRESTBLACK  >90       UA RESULTS:   Recent Labs   Lab Test  11/10/17   0725   SG  1.017   URINEPH  5.0   NITRITE  Negative   RBCU  1   WBCU  1       CALCIUM " RESULTS  No results found for: JOHN    PSA RESULTS  PSA   Date Value Ref Range Status   11/27/2017 2.86 0 - 4 ug/L Final     Comment:     Assay Method:  Chemiluminescence using Siemens Vista analyzer   11/10/2017 4.34 (H) 0 - 4 ug/L Final     Comment:     Assay Method:  Chemiluminescence using Siemens Vista analyzer              Assessment and Plan:     Assessment:62 year old male with abnormal YURI and hx of elevated PSA (though normal on repeat)    Plan:  -Discussion had regarding diagnostic options regarding above findings.  While PSA is relatively normal, his family history of prostate cancer as well as his abnormal YURI do make possibility of clinically significant prostate cancer slightly higher.  Ultimately shared decision was made to proceed with prostate MRI and then fusion biopsy depending on findings.      IDar saw and evaluated this patient and agree with the plan as stated above      Answers for HPI/ROS submitted by the patient on 1/6/2018   General Symptoms: No  Skin Symptoms: No  HENT Symptoms: No  EYE SYMPTOMS: No  HEART SYMPTOMS: No  LUNG SYMPTOMS: No  INTESTINAL SYMPTOMS: No  URINARY SYMPTOMS: Yes  REPRODUCTIVE SYMPTOMS: No  SKELETAL SYMPTOMS: No  BLOOD SYMPTOMS: No  NERVOUS SYSTEM SYMPTOMS: No  MENTAL HEALTH SYMPTOMS: No  Trouble holding urine or incontinence: Yes  Pain or burning: Yes  Trouble starting or stopping: No  Increased frequency of urination: No  Blood in urine: No  Decreased frequency of urination: No  Frequent nighttime urination: Yes  Flank pain: No  Difficulty emptying bladder: No

## 2018-01-09 NOTE — MR AVS SNAPSHOT
After Visit Summary   1/9/2018    Driss Dillon    MRN: [id number]           Patient Information     Date Of Birth          1955        Visit Information        Provider Department      1/9/2018 7:00 AM Dar Monroe MD [facility] Urology and [facility] for Prostate and Urologic Cancers        Today's Diagnoses     Prostate nodule    -  1      Care Instructions    Schedule prostate MRI.    Schedule Fusion Biopsy with Dr. Campos.    Stop at the lab today for urine sample.    It was a pleasure meeting with you today.  Thank you for allowing me and my team the privilege of caring for you today.  YOU are the reason we are here, and I truly hope we provided you with the excellent service you deserve.  Please let us know if there is anything else we can do for you so that we can be sure you are leaving completely satisfied with your care experience.      Ishmael Mendoza LPN          Follow-ups after your visit        Your next 10 appointments already scheduled     Jan 09, 2018  8:00 AM CST   LAB with  LAB   [facility] Lab ([facility])    [address] [place] [address] [phone]           Please do not eat 10-12 hours before your appointment if you are coming in fasting for labs on lipids, cholesterol, or glucose (sugar). This does not apply to pregnant women. Water, hot tea and black coffee (with nothing added) are okay. Do not drink other fluids, diet soda or chew gum.            Jan 11, 2018  8:00 AM CST   (Arrive by 7:45 AM)   Procedure with Pete Zazueta MD   [facility] Dermatology ([facility])    [address] [place] [id number] [phone]            Feb 09, 2018  7:45 AM CST   (Arrive by 7:30 AM)   MR PROSTATE with [id number]   [facility] MRI ([facility])    [address] [place] [id number] [phone]           Take  your medicines as usual, unless your doctor tells you not to. Bring a list of your current medicines to your exam (including vitamins, minerals and over-the-counter drugs).  You will be given intravenous contrast for this exam. To prepare:   The day before your exam, drink extra fluids at least six 8-ounce glasses (unless your doctor tells you to restrict your fluids).   Have a blood test (creatinine test) within 30 days of your exam. Go to your clinic or Diagnostic Imaging Department for this test.  The MRI machine uses a strong magnet. Please wear clothes without metal (snaps, zippers). A sweatsuit works well, or we may give you a hospital gown.  Please remove any body piercings and hair extensions before you arrive. You will also remove watches, jewelry, hairpins, wallets, dentures, partial dental plates and hearing aids. You may wear contact lenses, and you may be able to wear your rings. We have a safe place to keep your personal items, but it is safer to leave them at home.   **IMPORTANT** THE INSTRUCTIONS BELOW ARE ONLY FOR THOSE PATIENTS WHO HAVE BEEN TOLD THEY WILL RECEIVE SEDATION OR GENERAL ANESTHESIA DURING THEIR MRI PROCEDURE:  IF YOU WILL RECEIVE SEDATION (take medicine to help you relax during your exam):   You must get the medicine from your doctor before you arrive. Bring the medicine to the exam. Do not take it at home.   Arrive one hour early. Bring someone who can take you home after the test. Your medicine will make you sleepy. After the exam, you may not drive, take a bus or take a taxi by yourself.   No eating 8 hours before your exam. You may have clear liquids up until 4 hours before your exam. (Clear liquids include water, clear tea, black coffee and fruit juice without pulp.)  IF YOU WILL RECEIVE ANESTHESIA (be asleep for your exam):   Arrive 1 1/2 hours early. Bring someone who can take you home after the test. You may not drive, take a bus or take a taxi by yourself.   No eating 8 hours  before your exam. You may have clear liquids up until 4 hours before your exam. (Clear liquids include water, clear tea, black coffee and fruit juice without pulp.)  Please call the Imaging Department at your exam site with any questions.            Feb 27, 2018 12:00 PM CST   (Arrive by 11:45 AM)   Sonography/Biopsy with Stephan Campos MD   [facility] Urology and [facility] for Prostate and Urologic Cancers ([facility])    [address] [place] [address] [phone]            Mar 06, 2018  9:30 AM CST   (Arrive by 9:15 AM)   Return Visit with Stephan Campos MD   [facility] Urology and [facility] for Prostate and Urologic Cancers ([facility])    [address] [place] [address] [phone]              Future tests that were ordered for you today     Open Future Orders        Priority Expected Expires Ordered    Urine Culture Aerobic Bacterial Routine  1/9/2019 1/9/2018    MRI Prostate Routine  1/9/2019 1/9/2018            Who to contact     Please call your clinic at [phone] to:    Ask questions about your health    Make or cancel appointments    Discuss your medicines    Learn about your test results    Speak to your doctor   If you have compliments or concerns about an experience at your clinic, or if you wish to file a complaint, please contact [facility] Patient Relations at [phone] or email us at [email or web address]         Additional Information About Your Visit        [employer] Information     [employer] gives you secure access to your electronic health record. If you see a primary care provider, you can also send messages to your care team and make appointments. If you have questions, please call your primary care clinic.  If you do not have a primary care provider, please call [phone] and they will assist you.      [employer] is an electronic  "gateway that provides easy, online access to your medical records. With [employer], you can request a clinic appointment, read your test results, renew a prescription or communicate with your care team.     To access your existing account, please contact your [facility] or call [phone] for assistance.        Care EveryWhere ID     This is your Care EveryWhere ID. This could be used by other organizations to access your [place] medical records  [id number]        Your Vitals Were     Pulse Height BMI (Body Mass Index)             60 1.715 m (5' 7.5\") 28.76 kg/m2          Blood Pressure from Last 3 Encounters:   01/09/18 (!) 154/92   11/10/17 143/84    Weight from Last 3 Encounters:   01/09/18 84.6 kg (186 lb 6.4 oz)   11/10/17 82.6 kg (182 lb)               Primary Care Provider    None Specified       No primary provider on file.        Equal Access to Services     TAYLOR [facility] : Hadii nikolai rayo [place], waaxda luqadaha, qaybta kaalmada adeegyada, arturo lima . So [facility] [phone].    ATENCIÓN: Si habla español, tiene a khoury disposición servicios gratuitos de asistencia lingüística. Hitehsame al [phone].    We comply with applicable federal civil rights laws and [place] laws. We do not discriminate on the basis of race, color, national origin, age, disability, sex, sexual orientation, or gender identity.            Thank you!     Thank you for choosing [facility] UROLOGY AND [facility] FOR PROSTATE AND UROLOGIC CANCERS  for your care. Our goal is always to provide you with excellent care. Hearing back from our patients is one way we can continue to improve our services. Please take a few minutes to complete the written survey that you may receive in the mail after your visit with us. Thank you!             Your Updated Medication List - Protect others around you: Learn how to safely use, store and throw away your medicines at www.disposemymeds.org.    "       This list is accurate as of: 1/9/18  7:57 AM.  Always use your most recent med list.                   Brand Name Dispense Instructions for use Diagnosis    ADVIL PO           aspirin 81 MG tablet      Take 81 mg by mouth daily        atorvastatin 10 MG tablet    LIPITOR    90 tablet    Take 1 tablet (10 mg) by mouth daily    Other hyperlipidemia       fluorouracil 5 % cream    EFUDEX    40 g    To forehead, ears, cheeks, nose nightly x3 weeks.    AK (actinic keratosis)       losartan 50 MG tablet    COZAAR    90 tablet    Take 1 tablet (50 mg) by mouth daily    Benign essential hypertension       Multi-vitamin Tabs tablet      Take 1 tablet by mouth daily        tamsulosin 0.4 MG capsule    FLOMAX    90 capsule    Take 1 capsule (0.4 mg) by mouth daily    Benign prostatic hyperplasia, unspecified whether lower urinary tract symptoms present       TYLENOL PO

## 2018-01-09 NOTE — PATIENT INSTRUCTIONS
Schedule prostate MRI.    Schedule Fusion Biopsy with Dr. Campos.    Stop at the lab today for urine sample.    It was a pleasure meeting with you today.  Thank you for allowing me and my team the privilege of caring for you today.  YOU are the reason we are here, and I truly hope we provided you with the excellent service you deserve.  Please let us know if there is anything else we can do for you so that we can be sure you are leaving completely satisfied with your care experience.      Ishmael Mendoza LPN

## 2018-01-09 NOTE — LETTER
2018       RE: Driss Dillon  [address]  [address]  [place] [address]     Dear Colleague,    Thank you for referring your patient, Driss Dillon, to the [facility] UROLOGY AND INST FOR PROSTATE AND UROLOGIC CANCERS at [facility]. Please see a copy of my visit note below.            Chief Complaint:   Elevated PSA         History of Present Illness:    Driss Dillon is a very pleasant 62 year old male who presents with a history of elevated PSA.  He does have a family history of prostate cancer in his father who was treated with prostatectomy and ultimately  of colon cancer.  PSA's have ranged between mid 2's and low 3s since .  It was 4.3 on  and was later repeated 2 weeks later and came down to 2.86.  He was working out on an exercise bike during the first elevated value.  Uses flomax for voiding which satisfactorily addresses mild LUTS.  No history of UTI, prostatitis, gross hematuria.         Past Medical History:     Past Medical History:   Diagnosis Date     Cancer (H) ,     skin cancer forehead     Colon polyps 2016    removed, benign     Hyperlipidemia      Hypertension             Past Surgical History:     Past Surgical History:   Procedure Laterality Date     COLONOSCOPY N/A 2016            Medications     Current Outpatient Prescriptions   Medication     tamsulosin (FLOMAX) 0.4 MG capsule     losartan (COZAAR) 50 MG tablet     atorvastatin (LIPITOR) 10 MG tablet     multivitamin, therapeutic with minerals (MULTI-VITAMIN) TABS tablet     aspirin 81 MG tablet     Ibuprofen (ADVIL PO)     Acetaminophen (TYLENOL PO)     fluorouracil (EFUDEX) 5 % cream     No current facility-administered medications for this visit.             Family History:     Family History   Problem Relation Age of Onset     CANCER Father      Colorectal Cancer Father      terminal     Other Cancer Father      prostate     Ulcerative Colitis Father      Skin  "Cancer Mother      skin cancer     Alzheimer Disease Maternal Grandfather      Glaucoma No family hx of      Macular Degeneration No family hx of             Social History:     Social History     Social History     Marital status:      Spouse name: N/A     Number of children: N/A     Years of education: N/A     Occupational History     Not on file.     Social History Main Topics     Smoking status: Never Smoker     Smokeless tobacco: Never Used     Alcohol use Yes      Comment: 1-2 glasses of wine daily     Drug use: No     Sexual activity: Yes     Partners: Male     Birth control/ protection: Male Surgical     Other Topics Concern     Not on file     Social History Narrative            Allergies:   Review of patient's allergies indicates no known allergies.         Review of Systems:  From intake questionnaire   Negative 14 system review except as noted on HPI, nurse's note.         Physical Exam:   Patient is a 62 year old  male   Vitals: Blood pressure (!) 154/92, pulse 60, height 1.715 m (5' 7.5\"), weight 84.6 kg (186 lb 6.4 oz).  General Appearance Adult: Alert, no acute distress, oriented  HENT: throat/mouth:normal, good dentition  Neck: No adenopathy,masses or thyromegaly  Lungs: no respiratory distress, or pursed lip breathing  Heart: No obvious jugular venous distension present  Abdomen: soft, nontender, no organomegaly or masses, Body mass index is 28.76 kg/(m^2).  Lymphatics: No cervical or supraclavicular adenopathy  Musculoskeltal: extremities normal, no peripheral edema  Skin: no suspicious lesions or rashes  Neuro: Alert, oriented, speech and mentation normal  Psych: affect and mood normal  Gait: Normal  : Normal circumcised phallus, bilateral descended testes, no masses, 40 gm prostate, left sided nodule        Labs and Pathology:    I personally reviewed all applicable laboratory data and went over findings with patient  Significant for:    CBC RESULTS:  Recent Labs   Lab Test  11/10/17   " 0724   WBC  9.0   HGB  14.4   PLT  281        BMP RESULTS:  Recent Labs   Lab Test  11/10/17   0724   POTASSIUM  4.3   GLC  99   CR  0.82   GFRESTIMATED  >90   GFRESTBLACK  >90       UA RESULTS:   Recent Labs   Lab Test  11/10/17   0725   SG  1.017   URINEPH  5.0   NITRITE  Negative   RBCU  1   WBCU  1       CALCIUM RESULTS  No results found for: JOHN    PSA RESULTS  PSA   Date Value Ref Range Status   11/27/2017 2.86 0 - 4 ug/L Final     Comment:     Assay Method:  Chemiluminescence using Siemens Vista analyzer   11/10/2017 4.34 (H) 0 - 4 ug/L Final     Comment:     Assay Method:  Chemiluminescence using Siemens Vista analyzer              Assessment and Plan:     Assessment:62 year old male with abnormal YURI and hx of elevated PSA (though normal on repeat)    Plan:  -Discussion had regarding diagnostic options regarding above findings.  While PSA is relatively normal, his family history of prostate cancer as well as his abnormal YURI do make possibility of clinically significant prostate cancer slightly higher.  Ultimately shared decision was made to proceed with prostate MRI and then fusion biopsy depending on findings.      I, Dar Monroe saw and evaluated this patient and agree with the plan as stated above      Again, thank you for allowing me to participate in the care of your patient.      Sincerely,    Dar Monroe MD

## 2018-01-09 NOTE — NURSING NOTE
Chief Complaint   Patient presents with     Consult     New bph consult; elevated PSA.     Jenifer Moore

## 2018-01-10 LAB
BACTERIA SPEC CULT: NO GROWTH
Lab: NORMAL
SPECIMEN SOURCE: NORMAL

## 2018-01-11 ENCOUNTER — OFFICE VISIT (OUTPATIENT)
Dept: DERMATOLOGY | Facility: CLINIC | Age: 63
End: 2018-01-11
Payer: COMMERCIAL

## 2018-01-11 VITALS
RESPIRATION RATE: 18 BRPM | DIASTOLIC BLOOD PRESSURE: 98 MMHG | OXYGEN SATURATION: 96 % | HEART RATE: 74 BPM | SYSTOLIC BLOOD PRESSURE: 147 MMHG

## 2018-01-11 DIAGNOSIS — C44.519 BASAL CELL CARCINOMA OF TRUNK: Primary | ICD-10-CM

## 2018-01-11 ASSESSMENT — PAIN SCALES - GENERAL
PAINLEVEL: NO PAIN (0)
PAINLEVEL: NO PAIN (0)

## 2018-01-11 NOTE — MR AVS SNAPSHOT
After Visit Summary   1/11/2018    Driss Dillon    MRN: [id number]           Patient Information     Date Of Birth          1955        Visit Information        Provider Department      1/11/2018 8:00 AM Pete Zazueta MD [facility] Dermatology        Today's Diagnoses     Basal cell carcinoma of trunk    -  1      Care Instructions    Wound Care:  Electrodesiccation and Curettage     I will experience scar, altered skin color, bleeding, swelling, pain, crusting and redness. I may experience altered sensation. Risks are excessive bleeding, infection, muscle weakness, thick (hypertrophic or keloidal) scar, and recurrence,. A second procedure may be recommended to obtain the best cosmetic or functional result.    What is electrodesiccation and curettage ?    Scraping off tissue (curettage)    Destroy tissue using electric current or cautery (electrodessication)    How do I perform wound care?    Keep dressing in place for two days. You may shower with the dressing in place(do not get wet)    After 2 days, wash hands and remove dressing. Clean wound with cotton-swab soaked in hydrogen peroxide to remove drainage and crust    Put on a thick layer of Vaseline on the wound using a cotton-swab     Cover the wound with a Band-AidTM to protect from dust and tight clothing    If wound is draining before two days, change your dressing as described above sooner    During wound care, do not allow the area to dry out or form a scab    What do I need?    Hydrogen peroxide     Cotton-swabs     Vaseline or petroleum jelly     Band-AidsTM or dressing supplies as needed     When should I call the doctor?  [facility]: [phone]  [facility]: [phone]  For urgent needs outside of business hours call the [facility] at [phone] and ask for the dermatology resident on call            Follow-ups after your visit        Your next 10  appointments already scheduled     Feb 10, 2018  7:00 AM CST   (Arrive by 6:45 AM)   MR PROSTATE with [id number]   [facility] ([facility])    [address]  [address] [place] [address] [phone]           Take your medicines as usual, unless your doctor tells you not to. Bring a list of your current medicines to your exam (including vitamins, minerals and over-the-counter drugs).  You will be given intravenous contrast for this exam. To prepare:   The day before your exam, drink extra fluids at least six 8-ounce glasses (unless your doctor tells you to restrict your fluids).   Have a blood test (creatinine test) within 30 days of your exam. Go to your clinic or Diagnostic Imaging Department for this test.  The MRI machine uses a strong magnet. Please wear clothes without metal (snaps, zippers). A sweatsuit works well, or we may give you a hospital gown.  Please remove any body piercings and hair extensions before you arrive. You will also remove watches, jewelry, hairpins, wallets, dentures, partial dental plates and hearing aids. You may wear contact lenses, and you may be able to wear your rings. We have a safe place to keep your personal items, but it is safer to leave them at home.   **IMPORTANT** THE INSTRUCTIONS BELOW ARE ONLY FOR THOSE PATIENTS WHO HAVE BEEN TOLD THEY WILL RECEIVE SEDATION OR GENERAL ANESTHESIA DURING THEIR MRI PROCEDURE:  IF YOU WILL RECEIVE SEDATION (take medicine to help you relax during your exam):   You must get the medicine from your doctor before you arrive. Bring the medicine to the exam. Do not take it at home.   Arrive one hour early. Bring someone who can take you home after the test. Your medicine will make you sleepy. After the exam, you may not drive, take a bus or take a taxi by yourself.   No eating 8 hours before your exam. You may have clear liquids up until 4 hours before your exam. (Clear liquids include water,  clear tea, black coffee and fruit juice without pulp.)  IF YOU WILL RECEIVE ANESTHESIA (be asleep for your exam):   Arrive 1 1/2 hours early. Bring someone who can take you home after the test. You may not drive, take a bus or take a taxi by yourself.   No eating 8 hours before your exam. You may have clear liquids up until 4 hours before your exam. (Clear liquids include water, clear tea, black coffee and fruit juice without pulp.)  Please call the Imaging Department at your exam site with any questions.            Feb 27, 2018 12:00 PM CST   (Arrive by 11:45 AM)   Sonography/Biopsy with Stephan Campos MD   [facility] Urology and [facility] for Prostate and Urologic Cancers ([facility])    [address] [place] [address] [phone]            Mar 06, 2018  9:30 AM CST   (Arrive by 9:15 AM)   Return Visit with Stephan Campos MD   [facility] Urology and [facility] for Prostate and Urologic Cancers ([facility])    [address] [place] [address] [phone]              Who to contact     Please call your clinic at [phone] to:    Ask questions about your health    Make or cancel appointments    Discuss your medicines    Learn about your test results    Speak to your doctor   If you have compliments or concerns about an experience at your clinic, or if you wish to file a complaint, please contact [facility] Patient Relations at [phone] or email us at [email or web address]         Additional Information About Your Visit        MyChart Information     [employer] gives you secure access to your electronic health record. If you see a primary care provider, you can also send messages to your care team and make appointments. If you have questions, please call your primary care clinic.  If you do not have a primary care provider, please call [phone] and they  will assist you.      [employer] is an electronic gateway that provides easy, online access to your medical records. With [employer], you can request a clinic appointment, read your test results, renew a prescription or communicate with your care team.     To access your existing account, please contact your [facility] or call [phone] for assistance.        Care EveryWhere ID     This is your Care EveryWhere ID. This could be used by other organizations to access your [place] medical records  [id number]        Your Vitals Were     Pulse Respirations Pulse Oximetry             74 18 96%          Blood Pressure from Last 3 Encounters:   01/11/18 (!) 147/98   01/09/18 (!) 154/92   11/10/17 143/84    Weight from Last 3 Encounters:   01/09/18 84.6 kg (186 lb 6.4 oz)   11/10/17 82.6 kg (182 lb)              We Performed the Following     74469 - DESTR MALIG LESION TRUNK/ARM/LEG 1.1-2.0 CM        Primary Care Provider    None Specified       No primary provider on file.        Equal Access to Services     TAYLOR SHARPE : Hadii aad ku hadasho Sooma[place], waaxda luqadaha, qaybta kaalmada adeegyada, waxay junior lima . So [facility] [phone].    ATENCIÓN: Si habla español, tiene a khoury disposición servicios gratuitos de asistencia lingüística. Llame al [phone].    We comply with applicable federal civil rights laws and [place] laws. We do not discriminate on the basis of race, color, national origin, age, disability, sex, sexual orientation, or gender identity.            Thank you!     Thank you for choosing [facility] DERMATOLOG[facility]  for your care. Our goal is always to provide you with excellent care. Hearing back from our patients is one way we can continue to improve our services. Please take a few minutes to complete the written survey that you may receive in the mail after your visit with us. Thank you!             Your Updated Medication List - Protect others around  you: Learn how to safely use, store and throw away your medicines at www.disposemymeds.org.          This list is accurate as of: 1/11/18  8:36 AM.  Always use your most recent med list.                   Brand Name Dispense Instructions for use Diagnosis    ADVIL PO           aspirin 81 MG tablet      Take 81 mg by mouth daily        atorvastatin 10 MG tablet    LIPITOR    90 tablet    Take 1 tablet (10 mg) by mouth daily    Other hyperlipidemia       fluorouracil 5 % cream    EFUDEX    40 g    To forehead, ears, cheeks, nose nightly x3 weeks.    AK (actinic keratosis)       losartan 50 MG tablet    COZAAR    90 tablet    Take 1 tablet (50 mg) by mouth daily    Benign essential hypertension       Multi-vitamin Tabs tablet      Take 1 tablet by mouth daily        tamsulosin 0.4 MG capsule    FLOMAX    90 capsule    Take 1 capsule (0.4 mg) by mouth daily    Benign prostatic hyperplasia, unspecified whether lower urinary tract symptoms present       TYLENOL PO

## 2018-01-11 NOTE — PROGRESS NOTES
Dermatology Problem List  FBSE 11/10/17  1. Hx NMSC   - superficial BCC, L superior flank, s/p biopsy 1   - BCC x 1 , SCC x 1 in 1990s in [place]  2.  Actinic keratoses   -  status post Carac treatment in [place] x5 days in 2016.    - beginning treatment with Efudex x3 weeks in 11/2017.   3. Solar lentigines.   4. Blue nevus, left inferior flank, s/p biopsy 11/2017    Dermatology Procedure Note: Electrodesiccation and Curettage    PREOPERATIVE DIAGNOSIS: Superficial BCC    POSTOPERATIVE DIAGNOSIS: same    LOCATION: L superior flank    SIZE: 1.1 cm     Treatment options including electrodessiccation and curettage (ED and C), excision and topicals were reviewed.  The expected cure rates, healing times and anticipated scars of each option were discussed and the patient elects to proceed with ED and C.     The risks and benefits of the procedure were described to the patient.  These include but are not limited to bleeding, infection, scar, incomplete removal, and recurrence. Written informed consent was obtained. Time-out was performed. The above site was cleansed with and injected with 3.0 mL1% lidocaine with epinephrine. Once anesthesia was obtained, the site was prepped with Chlorhexidine and rinsed with sterile saline. The lesion was curetted with  in 3 directions with a 1-2 mm margin and this was followed by electrodessication.  This process was repeated three times. The defect measured 1.2 cm. Vaseline and a bandage were applied to the wound. The patient tolerated the procedure well and was given post care instructions.    Clinical Follow-up: 6 months for FBSE  Dr. Carrillo staffed the patient.     Staff Involved:  Pete Zazueta MD   PGY-3 Dermatology Resident  Pager [phone]        I talked with and examined Driss Dillon and I agree with the assessment and the plan. I was present for the entire procedure. DARLYN Carrillo MD.

## 2018-01-11 NOTE — NURSING NOTE
ED&C treatment for BCC on left superior flank.    Vaseline, telfa and pressure dressing applied. Aftercare reviewed. All questions answered.      Jailene Rodriguez RN

## 2018-01-11 NOTE — LETTER
1/11/2018       RE: Driss Dillon  [address]  [place] [address]     Dear Colleague,    Thank you for referring your patient, Driss Dillon, to the [facility] DERMATOLOGY at [facility]. Please see a copy of my visit note below.    Dermatology Problem List  FBSE 11/10/17  1. Hx NMSC   - superficial BCC, L superior flank, s/p biopsy 1   - BCC x 1 , SCC x 1 in 1990s in [place]  2.  Actinic keratoses   -  status post Carac treatment in [place] x5 days in 2016.    - beginning treatment with Efudex x3 weeks in 11/2017.   3. Solar lentigines.   4. Blue nevus, left inferior flank, s/p biopsy 11/2017    Dermatology Procedure Note: Electrodesiccation and Curettage    PREOPERATIVE DIAGNOSIS: Superficial BCC    POSTOPERATIVE DIAGNOSIS: same    LOCATION: L superior flank    SIZE: 1.1 cm     Treatment options including electrodessiccation and curettage (ED and C), excision and topicals were reviewed.  The expected cure rates, healing times and anticipated scars of each option were discussed and the patient elects to proceed with ED and C.     The risks and benefits of the procedure were described to the patient.  These include but are not limited to bleeding, infection, scar, incomplete removal, and recurrence. Written informed consent was obtained. Time-out was performed. The above site was cleansed with and injected with 3.0 mL1% lidocaine with epinephrine. Once anesthesia was obtained, the site was prepped with Chlorhexidine and rinsed with sterile saline. The lesion was curetted with  in 3 directions with a 1-2 mm margin and this was followed by electrodessication.  This process was repeated three times. The defect measured 1.2 cm. Vaseline and a bandage were applied to the wound. The patient tolerated the procedure well and was given post care instructions.    Clinical Follow-up: 6 months for FBSE  Dr. Carrillo staffed the patient.     Staff Involved:  Pete Zazueta MD   PGY-3  Dermatology Resident  Pager [phone]

## 2018-01-11 NOTE — PATIENT INSTRUCTIONS
Wound Care:  Electrodesiccation and Curettage     I will experience scar, altered skin color, bleeding, swelling, pain, crusting and redness. I may experience altered sensation. Risks are excessive bleeding, infection, muscle weakness, thick (hypertrophic or keloidal) scar, and recurrence,. A second procedure may be recommended to obtain the best cosmetic or functional result.    What is electrodesiccation and curettage ?    Scraping off tissue (curettage)    Destroy tissue using electric current or cautery (electrodessication)    How do I perform wound care?    Keep dressing in place for two days. You may shower with the dressing in place(do not get wet)    After 2 days, wash hands and remove dressing. Clean wound with cotton-swab soaked in hydrogen peroxide to remove drainage and crust    Put on a thick layer of Vaseline on the wound using a cotton-swab     Cover the wound with a Band-AidTM to protect from dust and tight clothing    If wound is draining before two days, change your dressing as described above sooner    During wound care, do not allow the area to dry out or form a scab    What do I need?    Hydrogen peroxide     Cotton-swabs     Vaseline or petroleum jelly     Band-AidsTM or dressing supplies as needed     When should I call the doctor?  [facility]: [phone]  [facility]: [phone]  For urgent needs outside of business hours call the [facility] at [phone] and ask for the dermatology resident on call

## 2018-01-11 NOTE — NURSING NOTE
Dermatology Rooming Note    Driss Dillon's goals for this visit include:   Chief Complaint   Patient presents with     Skin Cancer     Excision of L superior flank- BCC      Lola Porter MA

## 2018-02-10 ENCOUNTER — RADIANT APPOINTMENT (OUTPATIENT)
Dept: MRI IMAGING | Facility: CLINIC | Age: 63
End: 2018-02-10
Attending: UROLOGY
Payer: COMMERCIAL

## 2018-02-10 DIAGNOSIS — N40.2 PROSTATE NODULE: ICD-10-CM

## 2018-02-10 RX ORDER — GADOBUTROL 604.72 MG/ML
7.5 INJECTION INTRAVENOUS ONCE
Status: COMPLETED | OUTPATIENT
Start: 2018-02-10 | End: 2018-02-10

## 2018-02-10 RX ADMIN — GADOBUTROL 7.5 ML: 604.72 INJECTION INTRAVENOUS at 07:41

## 2018-02-10 NOTE — DISCHARGE INSTRUCTIONS
MRI Contrast Discharge Instructions    The IV contrast you received today will pass out of your body in your  urine. This will happen in the next 24 hours. You will not feel this process.  Your urine will not change color.    Drink at least 4 extra glasses of water or juice today (unless your doctor  has restricted your fluids). This reduces the stress on your kidneys.  You may take your regular medicines.    If you are on dialysis: It is best to have dialysis today.    If you have a reaction: Most reactions happen right away. If you have  any new symptoms after leaving the hospital (such as hives or swelling),  call your hospital at the correct number below. Or call your family doctor.  If you have breathing distress or wheezing, call 911.    Special instructions: ***    I have read and understand the above information.    Signature:______________________________________ Date:___________    Staff:__________________________________________ Date:___________     Time:__________    [facility] Departments:    ___[place]: [phone]  ___[facility]: [phone]  ___[place]: [phone] ___[facility]: [phone]  ___[facility]: [phone]  ___[facility]: [phone]  ___[phone]  ___[facility]: [phone]  ___[phone]

## 2018-02-19 ENCOUNTER — PRE VISIT (OUTPATIENT)
Dept: UROLOGY | Facility: CLINIC | Age: 63
End: 2018-02-19

## 2018-02-19 DIAGNOSIS — R97.20 ELEVATED PROSTATE SPECIFIC ANTIGEN (PSA): Primary | ICD-10-CM

## 2018-02-19 RX ORDER — CIPROFLOXACIN 500 MG/1
500 TABLET, FILM COATED ORAL 2 TIMES DAILY
Qty: 6 TABLET | Refills: 0 | Status: SHIPPED | OUTPATIENT
Start: 2018-02-19 | End: 2019-03-29

## 2018-02-27 ENCOUNTER — OFFICE VISIT (OUTPATIENT)
Dept: UROLOGY | Facility: CLINIC | Age: 63
End: 2018-02-27
Payer: COMMERCIAL

## 2018-02-27 VITALS
HEIGHT: 67 IN | WEIGHT: 180 LBS | HEART RATE: 90 BPM | SYSTOLIC BLOOD PRESSURE: 156 MMHG | BODY MASS INDEX: 28.25 KG/M2 | DIASTOLIC BLOOD PRESSURE: 90 MMHG

## 2018-02-27 DIAGNOSIS — R89.9 ABNORMAL FINDINGS ON EXAMINATION OF GENITOURINARY ORGANS: Primary | ICD-10-CM

## 2018-02-27 ASSESSMENT — PAIN SCALES - GENERAL
PAINLEVEL: NO PAIN (0)
PAINLEVEL: NO PAIN (0)

## 2018-02-27 NOTE — NURSING NOTE
Invasive Procedure Safety Checklist:    Procedure: biopsy     Action: Complete sections and checkboxes as appropriate.    Pre-procedure:  1. Patient ID Verified with 2 identifiers (Corie and  or MRN) : YES    2. Procedure and site verified with patient/designee (when able) : YES    3. Accurate consent documentation in medical record : YES    4. H&P (or appropriate assessment) documented in medical record : NO  H&P must be up to 30 days prior to procedure an updated within 24 hours of                 Procedure as applicable.     5. Relevant diagnostic and radiology test results appropriately labeled and displayed as applicable : YES    6. Blood products, implants, devices, and/or special equipment available for the procedure as applicable : YES    7. Procedure site(s) marked with provider initials [Exclusions: none] : NO    8. Marking not required. Reason : Yes  Procedure does not require site marking    Time Out:     Time-Out performed immediately prior to starting procedure, including verbal and active participation of all team members addressing: YES    1. Correct patient identity.  2. Confirmed that the correct side and site are marked.  3. An accurate procedure to be done.  4. Agreement on the procedure to be done.  5. Correct patient position.  6. Relevant images and results are properly labeled and appropriately displayed.  7. The need to administer antibiotics or fluids for irrigation purposes during the procedure as applicable.  8. Safety precautions based on patient history or medication use.    During Procedure: Verification of correct person, site, and procedure occurs any time the responsibility for care of the patient is transferred to another member of the care team.

## 2018-02-27 NOTE — NURSING NOTE
The following medication was given:     MEDICATION: Lidocaine   ROUTE: MD dose   SITE: MD dose   DOSE: 10mg  LOT #: [id number]  :  [employer]   EXPIRATION DATE:  09/19  NDC#: [id number]       The following medication was given:     MEDICATION: Gentamicin   ROUTE: IM  SITE: RUQ - Gluteus  DOSE: 80mg  LOT #: [id number]  :  [employer]  EXPIRATION DATE:  01/19  NDC#: [id number]       Mari Bauman MA

## 2018-02-27 NOTE — LETTER
2/27/2018       RE: Driss Dillon  [address]  [place] [address]     Dear Colleague,    Thank you for referring your patient, Driss Dillon, to the [facility] UROLOGY AND [facility] FOR PROSTATE AND UROLOGIC CANCERS at [facility]. Please see a copy of my visit note below.    Service Date: 02/27/2018      REASON FOR VISIT TODAY:  Prostate biopsy.      HISTORY OF PRESENT ILLNESS:  Mr. Dillon is a 62-year-old gentleman followed in our clinic for a history of an abnormal digital rectal exam.  Due to the abnormal digital rectal exam, the patient presents today for a prostate biopsy.      PROCEDURE NOTE:  After informed consent was obtained, the patient was placed left side down on the biopsy table.  Digital rectal exam revealed a nodule in the approximately left mid of the prostate.  Next, the ultrasound probe was lubricated and placed into the patient's rectum.  Inspection of the prostate did reveal what appeared to be a possible hypoechoic lesion on the left side of the prostate.  Next, 5 mL of lidocaine was injected at the junction of the prostate and seminal vesicles bilaterally.  We then obtained measurements of the prostate, which revealed a prostate perhaps 53 g in size, which compares to a value of 48 g noted on the patient's MRI from 02/10/2018.  Next, 12 cores were taken in a standard sextant distribution with an additional core each from the left and right transition zones as well as one extra core taken from the apparent hypoechoic lesion on the left side of the prostate for a total of 15 cores obtained.  The patient tolerated the procedure without difficulty.  The patient was counseled that he can expect to see blood in his urine, semen and stool for the next several days and to contact us should he develop fevers, chills or sweats, as we may need to switch up his antibiotics.  We will see him back in 2 weeks to go over the results of his biopsy.         JORDAN  MD ROXI             D: 2018   T: 2018   MT: radha      Name:     CATHLEEN DE LEON   MRN:      [id number]        Account:      [id number]   :      1955           Service Date: 2018      Document: [id number]

## 2018-02-27 NOTE — PROGRESS NOTES
Service Date: 2018      REASON FOR VISIT TODAY:  Prostate biopsy.      HISTORY OF PRESENT ILLNESS:  Mr. De Leon is a 62-year-old gentleman followed in our clinic for a history of an abnormal digital rectal exam.  Due to the abnormal digital rectal exam, the patient presents today for a prostate biopsy.      PROCEDURE NOTE:  After informed consent was obtained, the patient was placed left side down on the biopsy table.  Digital rectal exam revealed a nodule in the approximately left mid of the prostate.  Next, the ultrasound probe was lubricated and placed into the patient's rectum.  Inspection of the prostate did reveal what appeared to be a possible hypoechoic lesion on the left side of the prostate.  Next, 5 mL of lidocaine was injected at the junction of the prostate and seminal vesicles bilaterally.  We then obtained measurements of the prostate, which revealed a prostate perhaps 53 g in size, which compares to a value of 48 g noted on the patient's MRI from 02/10/2018.  Next, 12 cores were taken in a standard sextant distribution with an additional core each from the left and right transition zones as well as one extra core taken from the apparent hypoechoic lesion on the left side of the prostate for a total of 15 cores obtained.  The patient tolerated the procedure without difficulty.  The patient was counseled that he can expect to see blood in his urine, semen and stool for the next several days and to contact us should he develop fevers, chills or sweats, as we may need to switch up his antibiotics.  We will see him back in 2 weeks to go over the results of his biopsy.         JORDAN DIANE MD             D: 2018   T: 2018   MT: radha      Name:     CATHLEEN DE LEON   MRN:      [id number]        Account:      [id number]   :      1955           Service Date: 2018      Document: [id number]

## 2018-02-27 NOTE — MR AVS SNAPSHOT
After Visit Summary   2/27/2018    Driss Dillon    MRN: [id number]           Patient Information     Date Of Birth          1955        Visit Information        Provider Department      2/27/2018 12:00 PM Stephan Campos MD [facility] Urology and [facility] for Prostate and Urologic Cancers        Today's Diagnoses     Elevated prostate specific antigen (PSA)    -  1      Care Instructions        [place] for Prostate and Urologic Cancers  Precautions Following a Prostate Biopsy    There are four conditions that you should watch for after a prostate biopsy:    1. Excessive pain  2. Bleeding irregularities (passing numerous  dime sized  clots or if your urine looks like cranberry juice)  3. Fever of 100 degrees or more  4. If you are unable to urinate        If any of these occur, call the Urology Clinic during normal business hours (M-F, 8:00-4:30) at [phone].  If you experience a problem after normal business hours, call our 24-hour phone number at [phone] and ask for the Urology Resident on call to be paged.      If you experience any discomfort following the biopsy, you may take Tylenol.  DO NOT TAKE ASPIRIN unless specified by your physician.   If the discomfort becomes severe or uncontrolled by medication, contact the Urology Clinic or Urology Resident (after normal business hours).      Do not be alarmed if you have some blood in your stool, in your urine, or ejaculate (semen).  This occurrence is normal and may last up to three (3) or four (4) days, usually intermittently.  Blood in the ejaculate (semen) may last several weeks, up to about a dozen ejaculations.  The blood in your ejaculate may appear as brown streaks, blood tinged, and immediately following a biopsy, it may appear bright red.      If you run a fever above 100 degrees, call the Urology Clinic or Urology Resident (after normal business hours) immediately.  If you are unable to reach your physician or the  Resident on call, go to the nearest emergency room.  Explain that you have had a transrectal biopsy of your prostate and what problems you are experiencing.        You should attempt to urinate following your biopsy before you leave the clinic.  If you are unable to urinate four (4) to six (6) hours after you leave the clinic, you will need to contact the Urology Clinic or the Resident on call.  If you are unable to reach your physician or the Resident on call, go to the nearest emergency room.            If you have any questions or concerns after your biopsy, feel free to contact the Urology Clinic at [phone] during M-F, 8:00-5pm business hours.  If you need to speak with someone after normal business hours, call [phone] and ask for the Resident on call to be paged.            Follow-ups after your visit        Your next 10 appointments already scheduled     Mar 06, 2018  9:30 AM CST   (Arrive by 9:15 AM)   Return Visit with Stephan Campos MD   [facility] Urology and [facility] for Prostate and Urologic Cancers ([facility] [place])    [address] [place] [id number] [phone]              Who to contact     Please call your clinic at [phone] to:    Ask questions about your health    Make or cancel appointments    Discuss your medicines    Learn about your test results    Speak to your doctor            Additional Information About Your Visit        [employer] Information     [employer] gives you secure access to your electronic health record. If you see a primary care provider, you can also send messages to your care team and make appointments. If you have questions, please call your primary care clinic.  If you do not have a primary care provider, please call [phone] and they will assist you.      [employer] is an electronic gateway that provides easy, online access to your medical records. With [employer], you can request a clinic appointment, read  "your test results, renew a prescription or communicate with your care team.     To access your existing account, please contact your [facility] or call [phone] for assistance.        Care EveryWhere ID     This is your Care EveryWhere ID. This could be used by other organizations to access your [facility] medical records  [id number]        Your Vitals Were     Pulse Height BMI (Body Mass Index)             90 1.702 m (5' 7\") 28.19 kg/m2          Blood Pressure from Last 3 Encounters:   02/27/18 156/90   01/11/18 (!) 147/98   01/09/18 (!) 154/92    Weight from Last 3 Encounters:   02/27/18 81.6 kg (180 lb)   01/09/18 84.6 kg (186 lb 6.4 oz)   11/10/17 82.6 kg (182 lb)              We Performed the Following     INTRAMUSCULAR/SUB-Q INJECTION        Primary Care Provider Fax #    Physician No Ref-Primary [phone]       No address on file        Equal Access to Services     TAYLOR SHARPE : Hadii nikolai ku hadasho Soomaali, waaxda luqadaha, qaybta kaalmada adeegyada, waxay junior lima . So [facility] [phone].    ATENCIÓN: Si habla español, tiene a khoury disposición servicios gratuitos de asistencia lingüística. Llame al [phone].    We comply with applicable federal civil rights laws and [place] laws. We do not discriminate on the basis of race, color, national origin, age, disability, sex, sexual orientation, or gender identity.            Thank you!     Thank you for choosing [facility] UROLOGY AND [facility] FOR PROSTATE AND UROLOGIC CANCERS  for your care. Our goal is always to provide you with excellent care. Hearing back from our patients is one way we can continue to improve our services. Please take a few minutes to complete the written survey that you may receive in the mail after your visit with us. Thank you!             Your Updated Medication List - Protect others around you: Learn how to safely use, store and throw away your medicines at " www.disposemymeds.org.          This list is accurate as of 2/27/18  1:47 PM.  Always use your most recent med list.                   Brand Name Dispense Instructions for use Diagnosis    ADVIL PO           aspirin 81 MG tablet      Take 81 mg by mouth daily        atorvastatin 10 MG tablet    LIPITOR    90 tablet    Take 1 tablet (10 mg) by mouth daily    Other hyperlipidemia       ciprofloxacin 500 MG tablet    CIPRO    6 tablet    Take 1 tablet (500 mg) by mouth 2 times daily    Elevated prostate specific antigen (PSA)       fluorouracil 5 % cream    EFUDEX    40 g    To forehead, ears, cheeks, nose nightly x3 weeks.    AK (actinic keratosis)       losartan 50 MG tablet    COZAAR    90 tablet    Take 1 tablet (50 mg) by mouth daily    Benign essential hypertension       Multi-vitamin Tabs tablet      Take 1 tablet by mouth daily        tamsulosin 0.4 MG capsule    FLOMAX    90 capsule    Take 1 capsule (0.4 mg) by mouth daily    Benign prostatic hyperplasia, unspecified whether lower urinary tract symptoms present       TYLENOL PO

## 2018-02-27 NOTE — PATIENT INSTRUCTIONS
[place] for Prostate and Urologic Cancers  Precautions Following a Prostate Biopsy    There are four conditions that you should watch for after a prostate biopsy:    1. Excessive pain  2. Bleeding irregularities (passing numerous  dime sized  clots or if your urine looks like cranberry juice)  3. Fever of 100 degrees or more  4. If you are unable to urinate        If any of these occur, call the Urology Clinic during normal business hours (M-F, 8:00-4:30) at [phone].  If you experience a problem after normal business hours, call our 24-hour phone number at [phone] and ask for the Urology Resident on call to be paged.      If you experience any discomfort following the biopsy, you may take Tylenol.  DO NOT TAKE ASPIRIN unless specified by your physician.   If the discomfort becomes severe or uncontrolled by medication, contact the Urology Clinic or Urology Resident (after normal business hours).      Do not be alarmed if you have some blood in your stool, in your urine, or ejaculate (semen).  This occurrence is normal and may last up to three (3) or four (4) days, usually intermittently.  Blood in the ejaculate (semen) may last several weeks, up to about a dozen ejaculations.  The blood in your ejaculate may appear as brown streaks, blood tinged, and immediately following a biopsy, it may appear bright red.      If you run a fever above 100 degrees, call the Urology Clinic or Urology Resident (after normal business hours) immediately.  If you are unable to reach your physician or the Resident on call, go to the nearest emergency room.  Explain that you have had a transrectal biopsy of your prostate and what problems you are experiencing.        You should attempt to urinate following your biopsy before you leave the clinic.  If you are unable to urinate four (4) to six (6) hours after you leave the clinic, you will need to contact the Urology Clinic or the Resident on call.  If you are unable to reach  your physician or the Resident on call, go to the nearest emergency room.            If you have any questions or concerns after your biopsy, feel free to contact the Urology Clinic at [phone] during M-F, 8:00-5pm business hours.  If you need to speak with someone after normal business hours, call [phone] and ask for the Resident on call to be paged.

## 2018-02-28 DIAGNOSIS — Z00.00 ROUTINE GENERAL MEDICAL EXAMINATION AT A HEALTH CARE FACILITY: Primary | ICD-10-CM

## 2018-03-01 LAB — COPATH REPORT: NORMAL

## 2018-03-06 ENCOUNTER — OFFICE VISIT (OUTPATIENT)
Dept: UROLOGY | Facility: CLINIC | Age: 63
End: 2018-03-06
Payer: COMMERCIAL

## 2018-03-06 VITALS
HEART RATE: 62 BPM | DIASTOLIC BLOOD PRESSURE: 90 MMHG | HEIGHT: 67 IN | BODY MASS INDEX: 29.03 KG/M2 | SYSTOLIC BLOOD PRESSURE: 142 MMHG | WEIGHT: 185 LBS

## 2018-03-06 DIAGNOSIS — R89.9 ABNORMAL FINDINGS ON EXAMINATION OF GENITOURINARY ORGANS: Primary | ICD-10-CM

## 2018-03-06 DIAGNOSIS — Z00.00 ROUTINE GENERAL MEDICAL EXAMINATION AT A HEALTH CARE FACILITY: ICD-10-CM

## 2018-03-06 LAB — VZV IGG SER QL IA: 6.6 AI (ref 0–0.8)

## 2018-03-06 ASSESSMENT — PAIN SCALES - GENERAL: PAINLEVEL: NO PAIN (0)

## 2018-03-06 NOTE — MR AVS SNAPSHOT
"              After Visit Summary   3/6/2018    Driss Dillon    MRN: [id number]           Patient Information     Date Of Birth          1955        Visit Information        Provider Department      3/6/2018 9:30 AM Stephan Campos MD [facility] Urology and [facility] for Prostate and Urologic Cancers        Today's Diagnoses     Abnormal findings on examination of genitourinary organs    -  1       Follow-ups after your visit        Who to contact     Please call your clinic at [phone] to:    Ask questions about your health    Make or cancel appointments    Discuss your medicines    Learn about your test results    Speak to your doctor            Additional Information About Your Visit        The [employer] Information     [facility] gives you secure access to your electronic health record. If you see a primary care provider, you can also send messages to your care team and make appointments. If you have questions, please call your primary care clinic.  If you do not have a primary care provider, please call [phone] and they will assist you.      [facility] is an electronic gateway that provides easy, online access to your medical records. With [facility], you can request a clinic appointment, read your test results, renew a prescription or communicate with your care team.     To access your existing account, please contact your [facility] or call [phone] for assistance.        Care EveryWhere ID     This is your Care EveryWhere ID. This could be used by other organizations to access your [place] medical records  [id number]        Your Vitals Were     Pulse Height BMI (Body Mass Index)             62 1.702 m (5' 7\") 28.98 kg/m2          Blood Pressure from Last 3 Encounters:   03/06/18 142/90   02/27/18 156/90   01/11/18 (!) 147/98    Weight from Last 3 Encounters:   03/06/18 83.9 kg (185 lb)   02/27/18 81.6 kg (180 lb)   01/09/18 84.6 kg (186 lb 6.4 oz)              Today, " you had the following     No orders found for display       Primary Care Provider Fax #    Physician No Ref-Primary [phone]       No address on file        Equal Access to Services     TAYLOR SHARPE : Hadii aad ku hadstacey Gilbertdale, virgil apurvafabrizioha, savita thomas, arturo galvez [facility] [phone].    ATENCIÓN: Si habla español, tiene a khoury disposición servicios gratuitos de asistencia lingüística. Llame al [phone].    We comply with applicable federal civil rights laws and [place] laws. We do not discriminate on the basis of race, color, national origin, age, disability, sex, sexual orientation, or gender identity.            Thank you!     Thank you for choosing [facility] UROLOGY AND [facility] FOR PROSTATE AND UROLOGIC CANCERS  for your care. Our goal is always to provide you with excellent care. Hearing back from our patients is one way we can continue to improve our services. Please take a few minutes to complete the written survey that you may receive in the mail after your visit with us. Thank you!             Your Updated Medication List - Protect others around you: Learn how to safely use, store and throw away your medicines at www.disposemymeds.org.          This list is accurate as of 3/6/18 11:59 PM.  Always use your most recent med list.                   Brand Name Dispense Instructions for use Diagnosis    ADVIL PO           aspirin 81 MG tablet      Take 81 mg by mouth daily        atorvastatin 10 MG tablet    LIPITOR    90 tablet    Take 1 tablet (10 mg) by mouth daily    Other hyperlipidemia       ciprofloxacin 500 MG tablet    CIPRO    6 tablet    Take 1 tablet (500 mg) by mouth 2 times daily    Elevated prostate specific antigen (PSA)       fluorouracil 5 % cream    EFUDEX    40 g    To forehead, ears, cheeks, nose nightly x3 weeks.    AK (actinic keratosis)       losartan 50 MG tablet    COZAAR    90 tablet    Take 1 tablet (50 mg) by mouth daily     Benign essential hypertension       Multi-vitamin Tabs tablet      Take 1 tablet by mouth daily        tamsulosin 0.4 MG capsule    FLOMAX    90 capsule    Take 1 capsule (0.4 mg) by mouth daily    Benign prostatic hyperplasia, unspecified whether lower urinary tract symptoms present       TYLENOL PO

## 2018-03-06 NOTE — PROGRESS NOTES
Service Date: 2018      REASON FOR VISIT:  Prostate biopsy results.      HISTORY OF PRESENT ILLNESS:  Mr. De Leon is a 62-year-old gentleman followed in our clinic for history of an abnormal digital rectal exam.  The patient also has a family history of prostate cancer and underwent a prostate biopsy a couple weeks ago.  The patient comes in today to go over the results.      PHYSICAL EXAMINATION:  On exam, his blood pressure is 142/90, pulse is 62.  He is in no acute distress.      The patient's prostate biopsy from 2018 showed only benign tissue with no evidence of malignancy.      ASSESSMENT AND PLAN:  Over half of today's 15-minute visit was spent counseling the patient regarding his prostate biopsy results.  I suggested to Mr. De Leon and his wife we are pleased to see his biopsy shows no evidence of cancer.  At this point, there is no further need for additional assessments.  The patient should go back to his regular PSA screening and it would take a marked change in his PSA or marked change in his digital rectal exam to want to consider repeating a biopsy at any time soon.  Mr. De Leon was in agreement with plan and will come back to see us in the future if we can be of further help.         JORDAN DIANE MD             D: 2018   T: 2018   MT: alia      Name:     CATHLEEN DE LEON   MRN:      [id number]        Account:      [id number]   :      1955           Service Date: 2018      Document: [id number]

## 2018-03-06 NOTE — LETTER
3/6/2018       RE: Driss De Leon  [address]  [place] [address]     Dear Colleague,    Thank you for referring your patient, Driss De Leon, to the [facility] UROLOGY AND INST FOR PROSTATE AND UROLOGIC CANCERS at [facility]. Please see a copy of my visit note below.    Service Date: 2018      REASON FOR VISIT:  Prostate biopsy results.      HISTORY OF PRESENT ILLNESS:  Mr. De Leon is a 62-year-old gentleman followed in our clinic for history of an abnormal digital rectal exam.  The patient also has a family history of prostate cancer and underwent a prostate biopsy a couple weeks ago.  The patient comes in today to go over the results.      PHYSICAL EXAMINATION:  On exam, his blood pressure is 142/90, pulse is 62.  He is in no acute distress.      The patient's prostate biopsy from 2018 showed only benign tissue with no evidence of malignancy.      ASSESSMENT AND PLAN:  Over half of today's 15-minute visit was spent counseling the patient regarding his prostate biopsy results.  I suggested to Mr. De Leon and his wife we are pleased to see his biopsy shows no evidence of cancer.  At this point, there is no further need for additional assessments.  The patient should go back to his regular PSA screening and it would take a marked change in his PSA or marked change in his digital rectal exam to want to consider repeating a biopsy at any time soon.  Mr. De Leon was in agreement with plan and will come back to see us in the future if we can be of further help.         JORDAN DIANE MD             D: 2018   T: 2018   MT: alia      Name:     DRISS DE LEON   MRN:      [id number]        Account:      [id number]   :      1955           Service Date: 2018      Document: [id number]

## 2018-12-06 ENCOUNTER — TELEPHONE (OUTPATIENT)
Dept: INTERNAL MEDICINE | Facility: CLINIC | Age: 63
End: 2018-12-06

## 2018-12-06 DIAGNOSIS — I10 BENIGN ESSENTIAL HYPERTENSION: ICD-10-CM

## 2018-12-08 RX ORDER — LOSARTAN POTASSIUM 50 MG/1
50 TABLET ORAL DAILY
Qty: 90 TABLET | Refills: 0 | Status: SHIPPED | OUTPATIENT
Start: 2018-12-08 | End: 2018-12-12

## 2018-12-12 ENCOUNTER — MYC REFILL (OUTPATIENT)
Dept: INTERNAL MEDICINE | Facility: CLINIC | Age: 63
End: 2018-12-12

## 2018-12-12 DIAGNOSIS — N40.0 BENIGN PROSTATIC HYPERPLASIA, UNSPECIFIED WHETHER LOWER URINARY TRACT SYMPTOMS PRESENT: ICD-10-CM

## 2018-12-12 DIAGNOSIS — I10 BENIGN ESSENTIAL HYPERTENSION: ICD-10-CM

## 2018-12-12 DIAGNOSIS — E78.49 OTHER HYPERLIPIDEMIA: ICD-10-CM

## 2018-12-14 RX ORDER — TAMSULOSIN HYDROCHLORIDE 0.4 MG/1
0.4 CAPSULE ORAL DAILY
Qty: 90 CAPSULE | Refills: 3 | Status: SHIPPED | OUTPATIENT
Start: 2018-12-14 | End: 2019-12-14

## 2018-12-14 RX ORDER — ATORVASTATIN CALCIUM 10 MG/1
10 TABLET, FILM COATED ORAL DAILY
Qty: 90 TABLET | Refills: 3 | Status: SHIPPED | OUTPATIENT
Start: 2018-12-14 | End: 2019-03-29

## 2018-12-14 RX ORDER — LOSARTAN POTASSIUM 50 MG/1
50 TABLET ORAL DAILY
Qty: 90 TABLET | Refills: 0 | Status: SHIPPED | OUTPATIENT
Start: 2018-12-14 | End: 2019-03-23

## 2018-12-14 NOTE — TELEPHONE ENCOUNTER
Discussed need for lab tests and BP check with patient (by phone). He agrees to schedule an appointment in next few weeks at which time potassium, creat, ALT, and CF will be arranged.

## 2019-03-04 ENCOUNTER — DOCUMENTATION ONLY (OUTPATIENT)
Dept: CARE COORDINATION | Facility: CLINIC | Age: 64
End: 2019-03-04

## 2019-03-20 DIAGNOSIS — I10 BENIGN ESSENTIAL HYPERTENSION: ICD-10-CM

## 2019-03-23 RX ORDER — LOSARTAN POTASSIUM 50 MG/1
50 TABLET ORAL DAILY
Qty: 90 TABLET | Refills: 0 | Status: SHIPPED | OUTPATIENT
Start: 2019-03-23 | End: 2019-06-28

## 2019-03-23 NOTE — TELEPHONE ENCOUNTER
losartan (COZAAR) 50 MG tablet  Last Written Prescription Date:  12/14/18  Last Fill Quantity:  90,   # refills: 0  Last Office Visit : 11/10/17  Future Office visit: 3/29/19    90 day to pharmacy    Routing  Because:  bp past due, creat , K+ past due

## 2019-03-29 ENCOUNTER — OFFICE VISIT (OUTPATIENT)
Dept: DERMATOLOGY | Facility: CLINIC | Age: 64
End: 2019-03-29
Payer: COMMERCIAL

## 2019-03-29 ENCOUNTER — APPOINTMENT (OUTPATIENT)
Dept: INTERNAL MEDICINE | Facility: CLINIC | Age: 64
End: 2019-03-29
Payer: COMMERCIAL

## 2019-03-29 ENCOUNTER — OFFICE VISIT (OUTPATIENT)
Dept: INTERNAL MEDICINE | Facility: CLINIC | Age: 64
End: 2019-03-29
Payer: COMMERCIAL

## 2019-03-29 ENCOUNTER — OFFICE VISIT (OUTPATIENT)
Dept: OPHTHALMOLOGY | Facility: CLINIC | Age: 64
End: 2019-03-29
Payer: COMMERCIAL

## 2019-03-29 ENCOUNTER — OFFICE VISIT (OUTPATIENT)
Dept: AUDIOLOGY | Facility: CLINIC | Age: 64
End: 2019-03-29
Payer: COMMERCIAL

## 2019-03-29 ENCOUNTER — ANCILLARY PROCEDURE (OUTPATIENT)
Dept: BONE DENSITY | Facility: CLINIC | Age: 64
End: 2019-03-29
Payer: COMMERCIAL

## 2019-03-29 VITALS
DIASTOLIC BLOOD PRESSURE: 97 MMHG | WEIGHT: 178.8 LBS | BODY MASS INDEX: 27.1 KG/M2 | HEIGHT: 68 IN | RESPIRATION RATE: 16 BRPM | HEART RATE: 56 BPM | SYSTOLIC BLOOD PRESSURE: 157 MMHG

## 2019-03-29 DIAGNOSIS — Z00.00 VISIT FOR PREVENTIVE HEALTH EXAMINATION: ICD-10-CM

## 2019-03-29 DIAGNOSIS — Z00.00 ENCOUNTER FOR PREVENTIVE HEALTH EXAMINATION: ICD-10-CM

## 2019-03-29 DIAGNOSIS — C44.519 BASAL CELL CARCINOMA OF TRUNK: ICD-10-CM

## 2019-03-29 DIAGNOSIS — H52.03 HYPERMETROPIA OF BOTH EYES: ICD-10-CM

## 2019-03-29 DIAGNOSIS — T88.7XXA MEDICATION SIDE EFFECTS: Primary | ICD-10-CM

## 2019-03-29 DIAGNOSIS — R97.20 ELEVATED PROSTATE SPECIFIC ANTIGEN (PSA): ICD-10-CM

## 2019-03-29 DIAGNOSIS — R06.09 DYSPNEA ON EXERTION: ICD-10-CM

## 2019-03-29 DIAGNOSIS — I10 BENIGN ESSENTIAL HYPERTENSION: ICD-10-CM

## 2019-03-29 DIAGNOSIS — M85.9 LOW BONE DENSITY: ICD-10-CM

## 2019-03-29 DIAGNOSIS — L57.0 AK (ACTINIC KERATOSIS): ICD-10-CM

## 2019-03-29 DIAGNOSIS — Z71.82 EXERCISE COUNSELING: Primary | ICD-10-CM

## 2019-03-29 DIAGNOSIS — Z86.0100 HISTORY OF COLONIC POLYPS: ICD-10-CM

## 2019-03-29 DIAGNOSIS — L81.4 SOLAR LENTIGINOSIS: Primary | ICD-10-CM

## 2019-03-29 DIAGNOSIS — E78.49 OTHER HYPERLIPIDEMIA: ICD-10-CM

## 2019-03-29 DIAGNOSIS — Z01.10 EXAMINATION OF EARS AND HEARING: ICD-10-CM

## 2019-03-29 DIAGNOSIS — H93.8X2 SENSATION OF PLUGGED EAR ON LEFT SIDE: Primary | ICD-10-CM

## 2019-03-29 DIAGNOSIS — H25.13 NUCLEAR SENILE CATARACT OF BOTH EYES: Primary | ICD-10-CM

## 2019-03-29 DIAGNOSIS — Z00.00 ROUTINE GENERAL MEDICAL EXAMINATION AT A HEALTH CARE FACILITY: Primary | ICD-10-CM

## 2019-03-29 LAB
ALBUMIN UR-MCNC: NEGATIVE MG/DL
ALP SERPL-CCNC: 58 U/L (ref 40–150)
ALT SERPL W P-5'-P-CCNC: 35 U/L (ref 0–70)
APPEARANCE UR: CLEAR
BASOPHILS # BLD AUTO: 0 10E9/L (ref 0–0.2)
BASOPHILS NFR BLD AUTO: 1 %
BILIRUB UR QL STRIP: NEGATIVE
CALCIUM SERPL-MCNC: 9.6 MG/DL (ref 8.5–10.1)
CHOLEST SERPL-MCNC: 143 MG/DL
COLOR UR AUTO: YELLOW
CREAT SERPL-MCNC: 0.82 MG/DL (ref 0.66–1.25)
DEPRECATED CALCIDIOL+CALCIFEROL SERPL-MC: 45 UG/L (ref 20–75)
DIFFERENTIAL METHOD BLD: NORMAL
EOSINOPHIL # BLD AUTO: 0.2 10E9/L (ref 0–0.7)
EOSINOPHIL NFR BLD AUTO: 4.9 %
ERYTHROCYTE [DISTWIDTH] IN BLOOD BY AUTOMATED COUNT: 12.9 % (ref 10–15)
EXPTIME-PRE: 8.17 SEC
FEF2575-%PRED-PRE: 90 %
FEF2575-PRE: 2.22 L/SEC
FEF2575-PRED: 2.46 L/SEC
FEFMAX-%PRED-PRE: 116 %
FEFMAX-PRE: 9.72 L/SEC
FEFMAX-PRED: 8.36 L/SEC
FEV1-%PRED-PRE: 96 %
FEV1-PRE: 2.85 L
FEV1FEV6-PRE: 78 %
FEV1FEV6-PRED: 78 %
FEV1FVC-PRE: 76 %
FEV1FVC-PRED: 78 %
FIFMAX-PRE: 7.52 L/SEC
FVC-%PRED-PRE: 98 %
FVC-PRE: 3.74 L
FVC-PRED: 3.78 L
GFR SERPL CREATININE-BSD FRML MDRD: >90 ML/MIN/{1.73_M2}
GLUCOSE SERPL-MCNC: 101 MG/DL (ref 70–99)
GLUCOSE UR STRIP-MCNC: NEGATIVE MG/DL
HCT VFR BLD AUTO: 46.4 % (ref 40–53)
HDLC SERPL-MCNC: 63 MG/DL
HGB BLD-MCNC: 15 G/DL (ref 13.3–17.7)
HGB UR QL STRIP: NEGATIVE
HIV 1+2 AB+HIV1 P24 AG SERPL QL IA: NONREACTIVE
IMM GRANULOCYTES # BLD: 0 10E9/L (ref 0–0.4)
IMM GRANULOCYTES NFR BLD: 0.2 %
KETONES UR STRIP-MCNC: NEGATIVE MG/DL
LDLC SERPL CALC-MCNC: 70 MG/DL
LEUKOCYTE ESTERASE UR QL STRIP: NEGATIVE
LYMPHOCYTES # BLD AUTO: 1.4 10E9/L (ref 0.8–5.3)
LYMPHOCYTES NFR BLD AUTO: 34 %
MCH RBC QN AUTO: 31.5 PG (ref 26.5–33)
MCHC RBC AUTO-ENTMCNC: 32.3 G/DL (ref 31.5–36.5)
MCV RBC AUTO: 98 FL (ref 78–100)
MONOCYTES # BLD AUTO: 0.6 10E9/L (ref 0–1.3)
MONOCYTES NFR BLD AUTO: 14.2 %
MUCOUS THREADS #/AREA URNS LPF: PRESENT /LPF
NEUTROPHILS # BLD AUTO: 1.9 10E9/L (ref 1.6–8.3)
NEUTROPHILS NFR BLD AUTO: 45.7 %
NITRATE UR QL: NEGATIVE
NONHDLC SERPL-MCNC: 80 MG/DL
NRBC # BLD AUTO: 0 10*3/UL
NRBC BLD AUTO-RTO: 0 /100
PH UR STRIP: 5 PH (ref 5–7)
PHOSPHATE SERPL-MCNC: 3.1 MG/DL (ref 2.5–4.5)
PLATELET # BLD AUTO: 233 10E9/L (ref 150–450)
POTASSIUM SERPL-SCNC: 4.9 MMOL/L (ref 3.4–5.3)
PSA SERPL-ACNC: 4.3 UG/L (ref 0–4)
RBC # BLD AUTO: 4.76 10E12/L (ref 4.4–5.9)
RBC #/AREA URNS AUTO: <1 /HPF (ref 0–2)
SOURCE: ABNORMAL
SP GR UR STRIP: 1.02 (ref 1–1.03)
TRIGL SERPL-MCNC: 53 MG/DL
TSH SERPL DL<=0.005 MIU/L-ACNC: 1.14 MU/L (ref 0.4–4)
UROBILINOGEN UR STRIP-MCNC: 0 MG/DL (ref 0–2)
WBC # BLD AUTO: 4.1 10E9/L (ref 4–11)
WBC #/AREA URNS AUTO: <1 /HPF (ref 0–5)

## 2019-03-29 RX ORDER — ATORVASTATIN CALCIUM 20 MG/1
20 TABLET, FILM COATED ORAL DAILY
Qty: 90 TABLET | Refills: 3 | Status: SHIPPED | OUTPATIENT
Start: 2019-03-29 | End: 2020-05-14

## 2019-03-29 RX ORDER — HYDROCHLOROTHIAZIDE 25 MG/1
12.5-25 TABLET ORAL DAILY
Qty: 90 TABLET | Refills: 3 | Status: SHIPPED | OUTPATIENT
Start: 2019-03-29 | End: 2020-05-14

## 2019-03-29 RX ORDER — FLUOROURACIL 50 MG/G
CREAM TOPICAL
Qty: 40 G | Refills: 1 | Status: SHIPPED | OUTPATIENT
Start: 2019-03-29

## 2019-03-29 ASSESSMENT — CUP TO DISC RATIO
OD_RATIO: 0.3
OS_RATIO: 0.3

## 2019-03-29 ASSESSMENT — CONF VISUAL FIELD
OD_NORMAL: 1
METHOD: COUNTING FINGERS
OS_NORMAL: 1

## 2019-03-29 ASSESSMENT — PAIN SCALES - GENERAL
PAINLEVEL: NO PAIN (0)
PAINLEVEL: NO PAIN (0)

## 2019-03-29 ASSESSMENT — REFRACTION_CURRENTRX
OD_DIAMETER: 14.2
OS_DIAMETER: 14.2
OD_BASECURVE: 8.5
OD_SPHERE: +0.75
OS_BRAND: 1-DAY ACUVUE MOIST
OS_BASECURVE: 8.5
OD_BRAND: 1-DAY ACUVUE MOIST
OS_SPHERE: +3.00

## 2019-03-29 ASSESSMENT — TONOMETRY
OD_IOP_MMHG: 21
OS_IOP_MMHG: 21
IOP_METHOD: ICARE

## 2019-03-29 ASSESSMENT — REFRACTION_MANIFEST
OS_SPHERE: +1.00
OD_CYLINDER: +0.75
OD_SPHERE: +0.50
OS_ADD: +2.00
OS_CYLINDER: SPHERE
OD_ADD: +2.00
OD_AXIS: 180

## 2019-03-29 ASSESSMENT — VISUAL ACUITY
OS_CC: J1+
CORRECTION_TYPE: CONTACTS
METHOD: SNELLEN - LINEAR
OD_CC: 20/20
OS_CC: 20/40

## 2019-03-29 ASSESSMENT — EXTERNAL EXAM - RIGHT EYE: OD_EXAM: NORMAL

## 2019-03-29 ASSESSMENT — SLIT LAMP EXAM - LIDS
COMMENTS: NORMAL
COMMENTS: NORMAL

## 2019-03-29 ASSESSMENT — EXTERNAL EXAM - LEFT EYE: OS_EXAM: NORMAL

## 2019-03-29 ASSESSMENT — MIFFLIN-ST. JEOR: SCORE: 1567.59

## 2019-03-29 NOTE — PROGRESS NOTES
AUDIOLOGY REPORT  [facility] Assessment    SUMMARY: Audiology visit completed. See audiogram for results.      RECOMMENDATIONS: Follow-up with Dr. Fragoso. Repeat hearing evaluation as medically indicated, sooner if concerns.      Belkys Santoro  Audiologist  [place] License  #[id number]

## 2019-03-29 NOTE — PATIENT INSTRUCTIONS
Cryotherapy    What is it?    Use of a very cold liquid, such as liquid nitrogen, to freeze and destroy abnormal skin cells that need to be removed    What should I expect?    Tenderness and redness    A small blister that might grow and fill with dark purple blood. There may be crusting.    More than one treatment may be needed if the lesions do not go away.    How do I care for the treated area?    Gently wash the area with your hands when bathing.    Use a thin layer of Vaseline to help with healing. You may use a Band-Aid.     The area should heal within 7-10 days and may leave behind a pink or lighter color.     Do not use an antibiotic or Neosporin ointment.     You may take acetaminophen (Tylenol) for pain.     Call your Doctor if you have:    Severe pain    Signs of infection (warmth, redness, cloudy yellow drainage, and or a bad smell)    Questions or concerns    Who should I call with questions?       [facility]: [phone]       [facility]: [phone]       For urgent needs outside of business hours call the [facility] at [phone]        and ask for the dermatology resident on call

## 2019-03-29 NOTE — LETTER
"3/29/2019       RE: Driss Dillon  [address] [place]  [address]  [place] [address]     Dear Colleague,    Thank you for referring your patient, Driss Dillon, to the [facility] AND WELLNESS at [facility]. Please see a copy of my visit note below.    History and Physical Examination     SUBJECTIVE: Chief complaint: preventive health review.     Past Medical History:  1.  History of non-melanoma skin cancer (basal cell carcinoma and squamous cell carcinoma)  2.  Status post tonsillectomy  3.  Dyslipidemia  4.  Hypertension  5.  History of colonic polyps, presumably adenomatous, details not available  6.  History of abnormal CT coronary calcium scan (composite score 27.6; 45th percentile for cohort)  7.  BPH with history of intermittently elevated PSA and prostate nodule, s/p MRI fusion biopsy with benign pathology on 9 samples, 2/2018    Adverse Drug Reactions: None.     Current Medications:  Tamsulosin, 0.4 mg daily  Losartan, 50 mg daily  Atorvastatin, 10 mg daily  Daily multivitamin supplement without iron  Aspirin, 81 mg daily  Acetaminophen, used as needed     Habits:  Tobacco: Never.  Alcohol: 2 servings of wine per day  Caffeine: 5-6 servings of coffee per day     Social History:  to Zelda for nearly 40 years; they are the parents of 2 children: daughter Tamy, age 36, an [employer] graduate and mother of 2 children who lives in [place], [place] with her Yarsani  ; and son Kristofer, age 34, a father of 2 children and [employer] graduate who played varsity football, attended the [employer], and works as a  in [place], [place].  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  He " enjoys flying to visit family members and his winter home in the [place].  He jogs for 30 minutes, 4 days per week and engages in 2 sessions of strength training per week.  When traveling, he rides a stationary bike.     Family History: Father  from complications of colon cancer at age 84 (diagnosed at age 80), with history of ulcerative colitis and prostate cancer.  Mother has a history of non-melanoma skin cancer and is in good health at age 92.  A sister is in good health at age 62.  Children are in good health.  All grandparents  at advanced ages; maternal grandfather had dementia.  A maternal uncle had melanoma.     Review of Systems:  Bilateral first CMC joint pain, without swelling, warmth, or erythema.  He reports significant exertional dyspnea when climbing the 3 flights of stairs to reach the top floor of his home.  He denies exertional chest discomfort, nausea, palpitations, lightheadedness, and arm discomfort.  Most recent colonoscopy was completed in ; he was advised at that time to schedule his next procedure in 2019.  Most recent tetanus (Tdap) was administered in 2016.  No history of pneumococcal or zoster vaccinations.  Remainder of complete review of systems was negative.     OBJECTIVE:     Vital signs: Height 67.5 inches.  Weight 178.75 pounds.  Blood pressure 151/100 on average of 3 automated readings.  Heart rate 56.  Respiratory rate 16  General: Alert, neatly dressed and groomed, in no acute distress.  HEENT: Atraumatic and normocephalic. Eyelids, pupils, and conjunctivae appeared normal. Lips, teeth and gums appear normal.  Oropharynx showed moist mucous membranes, without exudate or erythema.  Neck: Supple, without thyromegaly, mass, or bruit. No cervical or supraclavicular lymphadenopathy.  Back: No spinal or costovertebral angle tenderness.  Chest: Clear to auscultation and percussion. Normal respiratory effort.  Cardiovascular: No jugular venous distention. Regular  rate and rhythm, normal S1, S2 without murmur.  Abdomen: Bowel sounds positive; soft, nontender, without rebound, guarding, hepatosplenomegaly or mass.  Extremities: No cyanosis or edema.  Genitalia: Normal male genitalia, without scrotal mass or hernia. No inguinal lymphadenopathy.  Rectal: Normal tone, with nontender, smooth, symmetrically enlarged prostate. No rectal mass.  Skin: Examination was deferred; dermatology evaluation was completed earlier in day.  Neurologic: Cranial nerves II-XII were grossly intact. Sensory and motor examinations were normal. Normal gait.  Psychiatric: Alert and oriented ×3. Normal affect. Judgment and insight intact.     Creatinine 0.82, potassium 4.9, alkaline phosphatase 58, ALT 35, cholesterol 143, HDL 63, LDL 70, triglycerides 53, cholesterol/HDL 2.0, glucose 101, TSH 1.14, PSA 4.30, 25-hydroxy vitamin D 45, white blood cell count 4100, hemoglobin 15.0, platelets 233,000, HIV nonreactive, urinalysis unremarkable.     EKG showed sinus bradycardia with occasional premature ventricular complexes.  Spirometry showed an FEV1 of 2.85, with an FVC of 3.74; readings were 96% and 98% of predicted values, respectively.     DEXA scan showed low bone density, with most negative and valid T-score of -1.9 at the level of the right femoral neck.  Body composition showed 25.4% fat (52nd percentile); 2017 reading was 27.4% fat (70th percentile).  Body mass index was 27.9.     ASSESSMENT:     1.  Elevated PSA.  Unchanged genitourinary symptoms; current reading is similar to a reading in 11/2017, which was followed by a normal reading and MRI fusion biopsy showing benign pathology on each of 9 specimens.  Family history of prostate cancer and personal history intermittently elevated PSA.  Following review of the notes of his urology consultant in 2/2018, we agreed that he would return for repeat measurement of PSA after 1 month.  In the event of progressive elevation, urology consultation will be  requested.    2.  Low bone density.  Current findings are similar to those noted at his last visit in 11/2017 and in 8/2016 at [facility].  With acceptable 25-hydroxy vitamin D and a normal testosterone level at previous visit, plan will be to check calcium, phosphorus, an SPEP.  We will determine need for further evaluation based on these results.  I emphasized the importance of adequate vitamin D3 and dietary calcium intake.  He will be advised to begin daily use of a 1000- international unit vitamin D3 supplement, while maintaining a daily dietary calcium intake of 1200 mg and continuing his regimen of weight-bearing exercise.     3.  Hypertension.  Blood pressure is substantially higher than at previous visit.  We reviewed usual targets based on most recent guidelines.  He was advised to add hydrocodone thiazide, 12.5 mg daily.  He agrees to check 5 home blood pressure readings using an automated device that records the average of 3 readings.  He will contact me with his blood pressure data in return after 1 month for potassium and creatinine levels.  Further recommendations will be based on follow-up blood pressure readings.  We discussed the importance of continuing regular exercise, maintenance of ideal weight, and the relationship between alcohol and blood pressure.     4.  Dyslipidemia.  Using AHA/ACC guidelines, his estimated 10-year risk of a vascular event is 12.0%; optimal level is 7.5%.  He was advised to increase his dose of atorvastatin to 20 mg daily at bedtime, and to address blood pressure as noted above.  He will continue daily use of low-strength (81 mg).     5.  History of colonic polyps.  Repeat colonoscopy is due the near future and has been scheduled.     6.  Arthralgias.  No swelling, warmth, or erythema.  Location and absence of associated symptoms suggests osteoarthritis.  He was advised to use acetaminophen, up to 1000 mg 3 times a day as needed, with caution not to use  acetaminophen is consuming more than 2 servings of alcohol per day.  In addition, he will consider topical capsaicin use, per usual directions, allowing up to 6 weeks for effect.      7.  Impaired fasting glucose.  Current glucose level is minimally elevated.  We discussed the importance of maintenance of ideal weight, continued regular exercise, and continued adherence to a healthful diet.  He will be advised of usual guidelines regarding follow-up testing.     8.  Preventive care.  PCV 13 pneumococcal vaccination was administered at the time of his appointment.  He was advised to arrange a PV 23, vaccination after 1 year.  Zoster vaccination was recommended when current shortage has ended.  Colonoscopy has been scheduled.  With a goal of reducing likelihood of adverse effects, he was advised to avoid use of NSAIDs.  Based on a history of snoring in the setting of hypertension and impaired fasting glucose, I recommended that he ask his wife to monitor his breathing during sleep.  He agrees to arrange sleep clinic evaluation if she observes loud snoring, gasping, or pauses in his breathing.     PLAN: See above.     ~SRT    Again, thank you for allowing me to participate in the care of your patient.      Sincerely,    Dylan Fragoso MD

## 2019-03-29 NOTE — NURSING NOTE
Chief Complaints and History of Present Illnesses   Patient presents with     Annual Eye Exam     Chief Complaint(s) and History of Present Illness(es)     Annual Eye Exam     Laterality: both eyes    Onset: gradual    Onset: years ago    Frequency: constantly    Course: stable    Associated symptoms: Negative for eye pain    Treatments tried: no treatments    Pain scale: 0/10              Comments     Patient states vision has been stable since last eye exam, both eyes. Denies eye pain or irritation. Does not take eye drops.    Farzaneh Walker COT 9:50 AM March 29, 2019

## 2019-03-29 NOTE — PROGRESS NOTES
Physical Fitness Assessment:    See Fitness Action Plan for information.    Sekou Solorzano, PhD.  Exercise Physiologist  Fitness

## 2019-03-29 NOTE — PROGRESS NOTES
Assessment/Plan  (H25.13) Nuclear senile cataract of both eyes  (primary encounter diagnosis)  Comment: Not visually significant  Plan: Will monitor regularly. RTC with increase in visual symptoms, particularly new blur or haloes around lights.     (H52.03) Hypermetropia of both eyes  Plan: REFRACTION [83250], HC CONTACT LENS FITTING COSMETIC LVL 1 (17333.011)        SRx and CL Rx updated and released. Monitor for changes regularly.     Contact Lens Billing  V-Code:  - Soft spherical  Final Contact Lens Rx       Brand Base Curve Diameter Sphere    Right 1-Day Acuvue Moist 8.5 14.2 +0.75    Left 1-Day Acuvue Moist 8.5 14.2 +3.00    Expiration Date:  3/29/2021    Replacement:  Daily    Wearing Schedule:  Daily wear         Price per Unit: $75 fitting fee    These are for cosmetic contact lenses.    Encounter Diagnoses   Name Primary?     Nuclear senile cataract of both eyes Yes     Hypermetropia of both eyes           Complete documentation of historical and exam elements from today's encounter can  be found in the full encounter summary report (not reduplicated in this progress  note). I personally obtained the chief complaint(s) and history of present illness. I  confirmed and edited as necessary the review of systems, past medical/surgical  history, family history, social history, and examination findings as documented by  others; and I examined the patient myself. I personally reviewed the relevant tests,  images, and reports as documented above. I formulated and edited as necessary the  assessment and plan and discussed the findings and management plan with the  patient and family.    Sawyer Meade OD

## 2019-03-29 NOTE — NURSING NOTE
Dermatology Rooming Note    Driss Dillon's goals for this visit include:   Chief Complaint   Patient presents with     Skin Check     Skin check, no concerns noted.     Kita Trent LPN

## 2019-03-29 NOTE — PATIENT INSTRUCTIONS
Jessica Naqvi,    So glad to see you again and work with you today! :) I do feel great that you took my advice from our previous visit and incorporated those positive lifestyle changes (running) that we discussed last visit Fall2017. I'm really happy with the results we got today and think that your current fitness regimen is great to maintain your cardiovascular health and neuromuscular wellness in your upper body.    The only thing I would consider adding to your routine are lower body lifts so that you can improve, or at least maintain, your lower body strength and stability. The exercises I think would be beneficial include wide-stance squats, lunges, and hip bridging. I would definitely start off with only body weight first so that you get the form down (squats and lunges, keep the knee behind the toe; end range of motion knee is flexed 90 degrees, thighs are parallel to the floor), then move to lighter weights with a set of 3x10.     And keep up the good work with your running! Remember, any jogging is better than no jodging. :)     Take care,    Sekou Solorzano, PhD

## 2019-03-29 NOTE — NURSING NOTE
AHA BP    1st   166/104  2nd  147/98  3rd   157/97    Average   157/100  Ashley Chaudhary CMA 8:38 AM on 3/29/2019.

## 2019-03-29 NOTE — LETTER
3/29/2019       RE: Driss Dillon  [address]  [address]  [facility] [address]     Dear Colleague,    Thank you for referring your patient, Driss Dillon, to the [facility] DERMATOLOGY at [facility]. Please see a copy of my visit note below.    DERMATOLOGY CLINIC VISIT NOTE      CHIEF COMPLAINT:  Skin check for [facility].      Dermatology Problem List  FBSE 3/19  1. Hx NMSC              - superficial BCC, L superior flank, s/p biopsy 1              - BCC x 1 , SCC x 1 in 1990s in [place]  2.  Actinic keratoses              -  status post Carac treatment in [place] x5 days in 2016.               - Efudex x3 weeks in 11/2017.  Efudex x3 weeks in 4/19        3. Solar lentigines.   4. Blue nevus, left inferior flank, s/p biopsy 11/2017     HISTORY OF PRESENT ILLNESS:  Mr. Dillon is a 62-year-old male presenting to Dermatology Clinic for skin check as part of the [facility] Program. Has history of nmsc. Denies any new or changing spots today. No painful or bleeding areas.     Patient Active Problem List   Diagnosis     AK (actinic keratosis)     Neoplasm of uncertain behavior of skin     Solar lentiginosis       No Known Allergies      Current Outpatient Medications   Medication     fluorouracil (EFUDEX) 5 % external cream     Acetaminophen (TYLENOL PO)     aspirin 81 MG tablet     atorvastatin (LIPITOR) 20 MG tablet     hydrochlorothiazide (HYDRODIURIL) 25 MG tablet     Ibuprofen (ADVIL PO)     losartan (COZAAR) 50 MG tablet     multivitamin, therapeutic with minerals (MULTI-VITAMIN) TABS tablet     tamsulosin (FLOMAX) 0.4 MG capsule     No current facility-administered medications for this visit.            SOCIAL HISTORY:  Lives with his wife in [place].  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      FAMILY HISTORY:  Mother with nonmelanoma skin cancer.      REVIEW OF SYSTEMS:  The patient feels well without additional skin concerns today.      PHYSICAL EXAMINATION:   There were  no vitals taken for this visit.    GENERAL:  The patient is a healthy-appearing 62-year-old male in no distress.   HEENT:  Conjunctivae are clear.   PULMONARY:  Breathing comfortably on room air.   ABDOMEN:  No abdominal distention.   SKIN:  Examination today included the scalp, face, neck, chest, abdomen, back, arms, legs, hands, feet, buttocks.  Skin exam was normal except for as follows:   -- Few gritty papules on the L zygoma, temples    -- Extensive light brown, 1-3 mm macules across the shoulders, upper and lower back, chest, abdomen, legs.   -- Mild xerosis of arms and legs.   -- Surgical scar on right upper forehead.   -- Waxy papules on the back        ASSESSMENT AND PLAN:     1.  Past history of nonmelanoma skin cancer.  No evidence of recurrence today.  Recommended yearly skin check and ongoing sun protection.     2.  Solar lentigines, marker of past solar injury.  Sun protection ongoing.     3.  Aks on the face: Much improved compared to previous exams, but I did recommend a repeat course of efudex x21 days at patient's convenience.     RTC 1 year.      Elen Hensley MD  Dermatology Staff

## 2019-03-29 NOTE — LETTER
"3/29/2019    RE: Driss iDllon  [address]  [facility] [address]       History and Physical Examination     SUBJECTIVE: Chief complaint: preventive health review.     Past Medical History:  1.  History of non-melanoma skin cancer (basal cell carcinoma and squamous cell carcinoma)  2.  Status post tonsillectomy  3.  Dyslipidemia  4.  Hypertension  5.  History of colonic polyps, presumably adenomatous, details not available  6.  History of abnormal CT coronary calcium scan (composite score 27.6; 45th percentile for cohort)  7.  BPH with history of intermittently elevated PSA and prostate nodule, s/p MRI fusion biopsy with benign pathology on 9 samples, 2/2018    Adverse Drug Reactions: None.     Current Medications:  Tamsulosin, 0.4 mg daily  Losartan, 50 mg daily  Atorvastatin, 10 mg daily  Daily multivitamin supplement without iron  Aspirin, 81 mg daily  Acetaminophen, used as needed     Habits:  Tobacco: Never.  Alcohol: 2 servings of wine per day  Caffeine: 5-6 servings of coffee per day     Social History:  to Zelda for nearly 40 years; they are the parents of 2 children: daughter Tamy, age 36, an [employer] graduate and mother of 2 children who lives in [place], [place] with her [employer]  ; and son Kristofer, age 34, a father of 2 children and [employer] graduate who played varsity football, attended the [employer], and works as a  in [place], [place].  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  He enjoys flying to visit family members and his winter home in the [place] [facility] [facility].  He jogs for 30 minutes, 4 days per week and engages in 2 sessions of strength training per week.  When traveling, he rides a " stationary bike.     Family History: Father  from complications of colon cancer at age 84 (diagnosed at age 80), with history of ulcerative colitis and prostate cancer.  Mother has a history of non-melanoma skin cancer and is in good health at age 92.  A sister is in good health at age 62.  Children are in good health.  All grandparents  at advanced ages; maternal grandfather had dementia.  A maternal uncle had melanoma.     Review of Systems:  Bilateral first CMC joint pain, without swelling, warmth, or erythema.  He reports significant exertional dyspnea when climbing the 3 flights of stairs to reach the top floor of his home.  He denies exertional chest discomfort, nausea, palpitations, lightheadedness, and arm discomfort.  Most recent colonoscopy was completed in ; he was advised at that time to schedule his next procedure in 2019.  Most recent tetanus (Tdap) was administered in 2016.  No history of pneumococcal or zoster vaccinations.  Remainder of complete review of systems was negative.     OBJECTIVE:     Vital signs: Height 67.5 inches.  Weight 178.75 pounds.  Blood pressure 151/100 on average of 3 automated readings.  Heart rate 56.  Respiratory rate 16  General: Alert, neatly dressed and groomed, in no acute distress.  HEENT: Atraumatic and normocephalic. Eyelids, pupils, and conjunctivae appeared normal. Lips, teeth and gums appear normal.  Oropharynx showed moist mucous membranes, without exudate or erythema.  Neck: Supple, without thyromegaly, mass, or bruit. No cervical or supraclavicular lymphadenopathy.  Back: No spinal or costovertebral angle tenderness.  Chest: Clear to auscultation and percussion. Normal respiratory effort.  Cardiovascular: No jugular venous distention. Regular rate and rhythm, normal S1, S2 without murmur.  Abdomen: Bowel sounds positive; soft, nontender, without rebound, guarding, hepatosplenomegaly or mass.  Extremities: No cyanosis or edema.  Genitalia: Normal  male genitalia, without scrotal mass or hernia. No inguinal lymphadenopathy.  Rectal: Normal tone, with nontender, smooth, symmetrically enlarged prostate. No rectal mass.  Skin: Examination was deferred; dermatology evaluation was completed earlier in day.  Neurologic: Cranial nerves II-XII were grossly intact. Sensory and motor examinations were normal. Normal gait.  Psychiatric: Alert and oriented ×3. Normal affect. Judgment and insight intact.     Creatinine 0.82, potassium 4.9, alkaline phosphatase 58, ALT 35, cholesterol 143, HDL 63, LDL 70, triglycerides 53, cholesterol/HDL 2.0, glucose 101, TSH 1.14, PSA 4.30, 25-hydroxy vitamin D 45, white blood cell count 4100, hemoglobin 15.0, platelets 233,000, HIV nonreactive, urinalysis unremarkable.     EKG showed sinus bradycardia with occasional premature ventricular complexes.  Spirometry showed an FEV1 of 2.85, with an FVC of 3.74; readings were 96% and 98% of predicted values, respectively.     DEXA scan showed low bone density, with most negative and valid T-score of -1.9 at the level of the right femoral neck.  Body composition showed 25.4% fat (52nd percentile); 2017 reading was 27.4% fat (70th percentile).  Body mass index was 27.9.     ASSESSMENT:     1.  Elevated PSA.  Unchanged genitourinary symptoms; current reading is similar to a reading in 11/2017, which was followed by a normal reading and MRI fusion biopsy showing benign pathology on each of 9 specimens.  Family history of prostate cancer and personal history intermittently elevated PSA.  Following review of the notes of his urology consultant in 2/2018, we agreed that he would return for repeat measurement of PSA after 1 month.  In the event of progressive elevation, urology consultation will be requested.    2.  Low bone density.  Current findings are similar to those noted at his last visit in 11/2017 and in 8/2016 at [facility].  With acceptable 25-hydroxy vitamin D and a normal  testosterone level at previous visit, plan will be to check calcium, phosphorus, an SPEP.  We will determine need for further evaluation based on these results.  I emphasized the importance of adequate vitamin D3 and dietary calcium intake.  He will be advised to begin daily use of a 1000- international unit vitamin D3 supplement, while maintaining a daily dietary calcium intake of 1200 mg and continuing his regimen of weight-bearing exercise.     3.  Hypertension.  Blood pressure is substantially higher than at previous visit.  We reviewed usual targets based on most recent guidelines.  He was advised to add hydrocodone thiazide, 12.5 mg daily.  He agrees to check 5 home blood pressure readings using an automated device that records the average of 3 readings.  He will contact me with his blood pressure data in return after 1 month for potassium and creatinine levels.  Further recommendations will be based on follow-up blood pressure readings.  We discussed the importance of continuing regular exercise, maintenance of ideal weight, and the relationship between alcohol and blood pressure.     4.  Dyslipidemia.  Using AHA/ACC guidelines, his estimated 10-year risk of a vascular event is 12.0%; optimal level is 7.5%.  He was advised to increase his dose of atorvastatin to 20 mg daily at bedtime, and to address blood pressure as noted above.  He will continue daily use of low-strength (81 mg).     5.  History of colonic polyps.  Repeat colonoscopy is due the near future and has been scheduled.     6.  Arthralgias.  No swelling, warmth, or erythema.  Location and absence of associated symptoms suggests osteoarthritis.  He was advised to use acetaminophen, up to 1000 mg 3 times a day as needed, with caution not to use acetaminophen is consuming more than 2 servings of alcohol per day.  In addition, he will consider topical capsaicin use, per usual directions, allowing up to 6 weeks for effect.      7.  Impaired fasting  glucose.  Current glucose level is minimally elevated.  We discussed the importance of maintenance of ideal weight, continued regular exercise, and continued adherence to a healthful diet.  He will be advised of usual guidelines regarding follow-up testing.     8.  Preventive care.  PCV 13 pneumococcal vaccination was administered at the time of his appointment.  He was advised to arrange a PV 23, vaccination after 1 year.  Zoster vaccination was recommended when current shortage has ended.  Colonoscopy has been scheduled.  With a goal of reducing likelihood of adverse effects, he was advised to avoid use of NSAIDs.  Based on a history of snoring in the setting of hypertension and impaired fasting glucose, I recommended that he ask his wife to monitor his breathing during sleep.  He agrees to arrange sleep clinic evaluation if she observes loud snoring, gasping, or pauses in his breathing.     PLAN: See above.     ~SRT    Dylan Fragoso MD

## 2019-03-30 NOTE — PROGRESS NOTES
"History and Physical Examination     SUBJECTIVE: Chief complaint: preventive health review.     Past Medical History:  1.  History of non-melanoma skin cancer (basal cell carcinoma and squamous cell carcinoma)  2.  Status post tonsillectomy  3.  Dyslipidemia  4.  Hypertension  5.  History of colonic polyps, presumably adenomatous, details not available  6.  History of abnormal CT coronary calcium scan (composite score 27.6; 45th percentile for cohort)  7.  BPH with history of intermittently elevated PSA and prostate nodule, s/p MRI fusion biopsy with benign pathology on 9 samples, 2018    Adverse Drug Reactions: None.     Current Medications:  Tamsulosin, 0.4 mg daily  Losartan, 50 mg daily  Atorvastatin, 10 mg daily  Daily multivitamin supplement without iron  Aspirin, 81 mg daily  Acetaminophen, used as needed     Habits:  Tobacco: Never.  Alcohol: 2 servings of wine per day  Caffeine: 5-6 servings of coffee per day     Social History:  to Zelda for nearly 40 years; they are the parents of 2 children: daughter Tamy, age 36, an [employer] graduate and mother of 2 children who lives in [place], [place] with her [employer]  ; and son Kristofer, age 34, a father of 2 children and [employer] graduate who played varsity football, attended the [employer], and works as a  in [place], [place].  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  He enjoys flying to visit family members and his winter home in the [facility].  He jogs for 30 minutes, 4 days per week and engages in 2 sessions of strength training per week.  When traveling, he rides a stationary bike.     Family History: Father  from complications of colon cancer at age " 84 (diagnosed at age 80), with history of ulcerative colitis and prostate cancer.  Mother has a history of non-melanoma skin cancer and is in good health at age 92.  A sister is in good health at age 62.  Children are in good health.  All grandparents  at advanced ages; maternal grandfather had dementia.  A maternal uncle had melanoma.     Review of Systems:  Bilateral first CMC joint pain, without swelling, warmth, or erythema.  He reports significant exertional dyspnea when climbing the 3 flights of stairs to reach the top floor of his home.  He denies exertional chest discomfort, nausea, palpitations, lightheadedness, and arm discomfort.  Most recent colonoscopy was completed in ; he was advised at that time to schedule his next procedure in 2019.  Most recent tetanus (Tdap) was administered in 2016.  No history of pneumococcal or zoster vaccinations.  Remainder of complete review of systems was negative.     OBJECTIVE:     Vital signs: Height 67.5 inches.  Weight 178.75 pounds.  Blood pressure 151/100 on average of 3 automated readings.  Heart rate 56.  Respiratory rate 16  General: Alert, neatly dressed and groomed, in no acute distress.  HEENT: Atraumatic and normocephalic. Eyelids, pupils, and conjunctivae appeared normal. Lips, teeth and gums appear normal.  Oropharynx showed moist mucous membranes, without exudate or erythema.  Neck: Supple, without thyromegaly, mass, or bruit. No cervical or supraclavicular lymphadenopathy.  Back: No spinal or costovertebral angle tenderness.  Chest: Clear to auscultation and percussion. Normal respiratory effort.  Cardiovascular: No jugular venous distention. Regular rate and rhythm, normal S1, S2 without murmur.  Abdomen: Bowel sounds positive; soft, nontender, without rebound, guarding, hepatosplenomegaly or mass.  Extremities: No cyanosis or edema.  Genitalia: Normal male genitalia, without scrotal mass or hernia. No inguinal lymphadenopathy.  Rectal: Normal  tone, with nontender, smooth, symmetrically enlarged prostate. No rectal mass.  Skin: Examination was deferred; dermatology evaluation was completed earlier in day.  Neurologic: Cranial nerves II-XII were grossly intact. Sensory and motor examinations were normal. Normal gait.  Psychiatric: Alert and oriented ×3. Normal affect. Judgment and insight intact.     Creatinine 0.82, potassium 4.9, alkaline phosphatase 58, ALT 35, cholesterol 143, HDL 63, LDL 70, triglycerides 53, cholesterol/HDL 2.0, glucose 101, TSH 1.14, PSA 4.30, 25-hydroxy vitamin D 45, white blood cell count 4100, hemoglobin 15.0, platelets 233,000, HIV nonreactive, urinalysis unremarkable.     EKG showed sinus bradycardia with occasional premature ventricular complexes.  Spirometry showed an FEV1 of 2.85, with an FVC of 3.74; readings were 96% and 98% of predicted values, respectively.     DEXA scan showed low bone density, with most negative and valid T-score of -1.9 at the level of the right femoral neck.  Body composition showed 25.4% fat (52nd percentile); 2017 reading was 27.4% fat (70th percentile).  Body mass index was 27.9.     ASSESSMENT:     1.  Elevated PSA.  Unchanged genitourinary symptoms; current reading is similar to a reading in 11/2017, which was followed by a normal reading and MRI fusion biopsy showing benign pathology on each of 9 specimens.  Family history of prostate cancer and personal history intermittently elevated PSA.  Following review of the notes of his urology consultant in 2/2018, we agreed that he would return for repeat measurement of PSA after 1 month.  In the event of progressive elevation, urology consultation will be requested.    2.  Low bone density.  Current findings are similar to those noted at his last visit in 11/2017 and in 8/2016 at [facility].  With acceptable 25-hydroxy vitamin D and a normal testosterone level at previous visit, plan will be to check calcium, phosphorus, an SPEP.  We will  determine need for further evaluation based on these results.  I emphasized the importance of adequate vitamin D3 and dietary calcium intake.  He will be advised to begin daily use of a 1000- international unit vitamin D3 supplement, while maintaining a daily dietary calcium intake of 1200 mg and continuing his regimen of weight-bearing exercise.     3.  Hypertension.  Blood pressure is substantially higher than at previous visit.  We reviewed usual targets based on most recent guidelines.  He was advised to add hydrocodone thiazide, 12.5 mg daily.  He agrees to check 5 home blood pressure readings using an automated device that records the average of 3 readings.  He will contact me with his blood pressure data in return after 1 month for potassium and creatinine levels.  Further recommendations will be based on follow-up blood pressure readings.  We discussed the importance of continuing regular exercise, maintenance of ideal weight, and the relationship between alcohol and blood pressure.     4.  Dyslipidemia.  Using AHA/ACC guidelines, his estimated 10-year risk of a vascular event is 12.0%; optimal level is 7.5%.  He was advised to increase his dose of atorvastatin to 20 mg daily at bedtime, and to address blood pressure as noted above.  He will continue daily use of low-strength (81 mg).     5.  History of colonic polyps.  Repeat colonoscopy is due the near future and has been scheduled.     6.  Arthralgias.  No swelling, warmth, or erythema.  Location and absence of associated symptoms suggests osteoarthritis.  He was advised to use acetaminophen, up to 1000 mg 3 times a day as needed, with caution not to use acetaminophen is consuming more than 2 servings of alcohol per day.  In addition, he will consider topical capsaicin use, per usual directions, allowing up to 6 weeks for effect.      7.  Impaired fasting glucose.  Current glucose level is minimally elevated.  We discussed the importance of maintenance of  ideal weight, continued regular exercise, and continued adherence to a healthful diet.  He will be advised of usual guidelines regarding follow-up testing.     8.  Preventive care.  PCV 13 pneumococcal vaccination was administered at the time of his appointment.  He was advised to arrange a PV 23, vaccination after 1 year.  Zoster vaccination was recommended when current shortage has ended.  Colonoscopy has been scheduled.  With a goal of reducing likelihood of adverse effects, he was advised to avoid use of NSAIDs.  Based on a history of snoring in the setting of hypertension and impaired fasting glucose, I recommended that he ask his wife to monitor his breathing during sleep.  He agrees to arrange sleep clinic evaluation if she observes loud snoring, gasping, or pauses in his breathing.     PLAN: See above.     ~SRT

## 2019-04-01 LAB
ALBUMIN SERPL ELPH-MCNC: 4.3 G/DL (ref 3.7–5.1)
ALPHA1 GLOB SERPL ELPH-MCNC: 0.2 G/DL (ref 0.2–0.4)
ALPHA2 GLOB SERPL ELPH-MCNC: 0.6 G/DL (ref 0.5–0.9)
B-GLOBULIN SERPL ELPH-MCNC: 0.7 G/DL (ref 0.6–1)
GAMMA GLOB SERPL ELPH-MCNC: 0.9 G/DL (ref 0.7–1.6)
INTERPRETATION ECG - MUSE: NORMAL
M PROTEIN SERPL ELPH-MCNC: 0 G/DL
PROT PATTERN SERPL ELPH-IMP: NORMAL

## 2019-04-01 NOTE — PROGRESS NOTES
DERMATOLOGY CLINIC VISIT NOTE      CHIEF COMPLAINT:  Skin check for [facility].      Dermatology Problem List  FBSE 3/19  1. Hx NMSC              - superficial BCC, L superior flank, s/p biopsy 1              - BCC x 1 , SCC x 1 in 1990s in [place]  2.  Actinic keratoses              -  status post Carac treatment in [place] x5 days in 2016.               - Efudex x3 weeks in 11/2017. Efudex x3 weeks in 4/19        3. Solar lentigines.   4. Blue nevus, left inferior flank, s/p biopsy 11/2017     HISTORY OF PRESENT ILLNESS:  Mr. Dillon is a 62-year-old male presenting to Dermatology Clinic for skin check as part of the [facility] Program. Has history of nmsc. Denies any new or changing spots today. No painful or bleeding areas.     Patient Active Problem List   Diagnosis     AK (actinic keratosis)     Neoplasm of uncertain behavior of skin     Solar lentiginosis       No Known Allergies      Current Outpatient Medications   Medication     fluorouracil (EFUDEX) 5 % external cream     Acetaminophen (TYLENOL PO)     aspirin 81 MG tablet     atorvastatin (LIPITOR) 20 MG tablet     hydrochlorothiazide (HYDRODIURIL) 25 MG tablet     Ibuprofen (ADVIL PO)     losartan (COZAAR) 50 MG tablet     multivitamin, therapeutic with minerals (MULTI-VITAMIN) TABS tablet     tamsulosin (FLOMAX) 0.4 MG capsule     No current facility-administered medications for this visit.            SOCIAL HISTORY:  Lives with his wife in [place].  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      FAMILY HISTORY:  Mother with nonmelanoma skin cancer.      REVIEW OF SYSTEMS:  The patient feels well without additional skin concerns today.      PHYSICAL EXAMINATION:   There were no vitals taken for this visit.    GENERAL:  The patient is a healthy-appearing 62-year-old male in no distress.   HEENT:  Conjunctivae are clear.   PULMONARY:  Breathing comfortably on room air.   ABDOMEN:  No abdominal distention.   SKIN:  Examination today included the scalp,  face, neck, chest, abdomen, back, arms, legs, hands, feet, buttocks.  Skin exam was normal except for as follows:   -- Few gritty papules on the L zygoma, temples    -- Extensive light brown, 1-3 mm macules across the shoulders, upper and lower back, chest, abdomen, legs.   -- Mild xerosis of arms and legs.   -- Surgical scar on right upper forehead.   -- Waxy papules on the back        ASSESSMENT AND PLAN:     1.  Past history of nonmelanoma skin cancer.  No evidence of recurrence today.  Recommended yearly skin check and ongoing sun protection.     2.  Solar lentigines, marker of past solar injury.  Sun protection ongoing.     3.  Aks on the face: Much improved compared to previous exams, but I did recommend a repeat course of efudex x21 days at patient's convenience.     RTC 1 year.      Elen Hensley MD  Dermatology Staff

## 2019-04-02 NOTE — TELEPHONE ENCOUNTER
Labs are ordered and pt has an appointment on 5/10/19    Karmen Rankin [facility]  2:45 PM 4/2/2019

## 2019-04-30 ENCOUNTER — TELEPHONE (OUTPATIENT)
Dept: GASTROENTEROLOGY | Facility: CLINIC | Age: 64
End: 2019-04-30

## 2019-04-30 NOTE — TELEPHONE ENCOUNTER
Order Questions     Question Answer Comment   Procedure Colonoscopy    Purpose of Procedure Diagnostic    Does the patient have the following? Other (Specify in Comments)    Is the patient on the following medications? ASA 81 mg       Referring MD Dylan Fragoso   Associated Diagnoses     History of colonic polyps [Z86.010]         Biscoott message with request pt contact Endoscopy Pre-assessment RN to review upcoming procedure [facility] Endoscopy information.  Unable to leave   Telephone call-back number provided. Elsy Boothe RN    Additional Information regarding appointment:   Date/Arrival time: Tuesday May 7th@12:15 pm  Procedure Provider:  Dr. Hobbs Referring Provider. Dylan Fragoso  Prep Type:   [x] eRx: (not sent)  []NPO /p MN, No solid food /p 2200 the night before  Facility location:    [][address], 1st Floor, [address]  [x][address] [facility], 5th floor

## 2019-05-01 ENCOUNTER — TELEPHONE (OUTPATIENT)
Dept: GASTROENTEROLOGY | Facility: CLINIC | Age: 64
End: 2019-05-01

## 2019-05-02 ENCOUNTER — TELEPHONE (OUTPATIENT)
Dept: GASTROENTEROLOGY | Facility: CLINIC | Age: 64
End: 2019-05-02

## 2019-05-02 DIAGNOSIS — Z12.11 ENCOUNTER FOR SCREENING COLONOSCOPY: Primary | ICD-10-CM

## 2019-05-02 DIAGNOSIS — Z86.0100 HISTORY OF COLONIC POLYPS: Primary | ICD-10-CM

## 2019-05-02 RX ORDER — POLYETHYLENE GLYCOL 3350, SODIUM SULFATE ANHYDROUS, SODIUM BICARBONATE, SODIUM CHLORIDE, POTASSIUM CHLORIDE 236; 22.74; 6.74; 5.86; 2.97 G/4L; G/4L; G/4L; G/4L; G/4L
1 POWDER, FOR SOLUTION ORAL ONCE
Qty: 1 BOTTLE | Refills: 0 | Status: SHIPPED | OUTPATIENT
Start: 2019-05-02 | End: 2019-05-02

## 2019-05-02 NOTE — TELEPHONE ENCOUNTER
Patient scheduled for:  [] EGD  [x] Colonoscopy  [] EUS  [] Flex Sig   [] Other:     Indication for procedure. [] Screening   [x] History of colonic polyps    Procedure Provider:  Bon Murdock      Referring Provider. [place]    Arrival time verified: Tues; 5/7/19; 1215    Facility location verified:   [][facility] [address] [facility], 1st Floor, [address]  [x][address] [facility], 5th floor       Prep Type:   []Golytely eRx: ;  [x] MoviPrep: [employer] [place] [employer] [address] [place] [address] [address] AT [address] [place] [address] , [] MiraLax: , [] Other:   []NPO /p MN, No solid food /p 2200 the night before    Anticoagulants or blood thinners: []None [x] ASA 81mg  - may continue [] Warfarin  [] Warfarin + Lovenox bridge [] Plavix [] Effient [] Eliquis [] Xarelto  [] Brilinta [] NSAIDS  [] Other    LAST anticoagulant dose: Date/Time:   INR:     Electronic implanted devices: [x] No  [] IPG  []  ICD  []  LVAD  []     H&P / Pre op physical completed: [x] N/A, [] Complete, Date , [] Scheduled, Date , [] No,     Additional Information: Pt elected to use MoviPrep including 100% out-of-pocket costs if necessary.      He requests minimal or no IV sedation for Colonoscopy.    _______________________________________________      Instructions given: [] Rec d & Read   [x] Reviewed         [x] Resent via [employer] - This includes Bon Murdock 2-day Movi-Prep  instructions, Conscious Sedation policy, procedure date/time/location/provider.      [] Resent via eMail (see below)     Pre procedure teaching completed: [x] Yes - Reviewed, [] No,     [x] No questions regarding Sedation as ordered, []     Transportation from procedure & responsible adult to be with patient following procedure for a minimum of 6 hrs (Conscious Sedation) 24 hrs (MAC): [x] Yes Wife - will be with pt post procedure, [] Pending \, [] No \    Anna Carreno RN  [facility] Endoscopy

## 2019-05-02 NOTE — TELEPHONE ENCOUNTER
: [x] N/A   [] Yes:  Language /  ID:      full - unable to return call or leave  msg.  Review Epic notes Golytely Rx has been entered.     Anna Carreno RN  [facility] Endoscopy

## 2019-05-06 ENCOUNTER — TELEPHONE (OUTPATIENT)
Dept: GASTROENTEROLOGY | Facility: CLINIC | Age: 64
End: 2019-05-06

## 2019-05-06 NOTE — TELEPHONE ENCOUNTER
Returned call with question regarding not-clear Ensure EnLive.  His wife called with question about qualification as a clear liquid.      Reinforced information that chocolate or vanilla EnLive is not a clear liquid and may not be ingested during clear liquid portion of colon prep.     Mr. Dillon voiced comprehension to information.     Anna Carreno RN  [facility] Endoscopy

## 2019-05-07 ENCOUNTER — HOSPITAL ENCOUNTER (OUTPATIENT)
Facility: AMBULATORY SURGERY CENTER | Age: 64
End: 2019-05-07
Attending: INTERNAL MEDICINE
Payer: COMMERCIAL

## 2019-05-07 VITALS
WEIGHT: 173 LBS | SYSTOLIC BLOOD PRESSURE: 151 MMHG | HEIGHT: 68 IN | TEMPERATURE: 98.5 F | BODY MASS INDEX: 26.22 KG/M2 | RESPIRATION RATE: 14 BRPM | HEART RATE: 83 BPM | OXYGEN SATURATION: 98 % | DIASTOLIC BLOOD PRESSURE: 86 MMHG

## 2019-05-07 PROBLEM — K57.30 DIVERTICULOSIS OF LARGE INTESTINE: Status: ACTIVE | Noted: 2019-05-07

## 2019-05-07 LAB — COLONOSCOPY: NORMAL

## 2019-05-07 RX ORDER — ONDANSETRON 2 MG/ML
4 INJECTION INTRAMUSCULAR; INTRAVENOUS
Status: DISCONTINUED | OUTPATIENT
Start: 2019-05-07 | End: 2019-05-08 | Stop reason: HOSPADM

## 2019-05-07 RX ORDER — LIDOCAINE 40 MG/G
CREAM TOPICAL
Status: DISCONTINUED | OUTPATIENT
Start: 2019-05-07 | End: 2019-05-08 | Stop reason: HOSPADM

## 2019-05-07 ASSESSMENT — MIFFLIN-ST. JEOR: SCORE: 1549.22

## 2019-05-07 NOTE — DISCHARGE INSTRUCTIONS
Discharge Instructions after Colonoscopy  or Sigmoidoscopy    Today you had a Colonoscopy    Activity and Diet  You were given medicine for pain. You may be dizzy or sleepy.  For 24 hours:    Do not drive or use heavy equipment.    Do not make important decisions.    Do not drink any alcohol.  You may return to your normal diet and medicines.    Discomfort    Air was placed in your colon during the exam in order to see it. Walking helps to pass the air.    You may take Tylenol (acetaminophen) for pain unless your doctor has told you not to.    Follow-up  If we took small tissue samples or polyps to study, your doctor will call you with the results  within two weeks.    When to call:    Call right away if you have:    Unusual pain in belly or chest pain not relieved with passing air.    More than 1 to 2 Tablespoons of bleeding from your rectum.    Fever above 100.6  F (37.5  C).    If you have severe pain, bleeding, or shortness of breath, go to an emergency room.    If you have questions, call:  Monday to Friday, 7 a.m. to 4:30 p.m.  Endoscopy: [phone] (We may have to call you back)    After hours  Hospital: [phone] (Ask for the GI fellow on call)

## 2019-05-10 ENCOUNTER — ANCILLARY PROCEDURE (OUTPATIENT)
Dept: CARDIOLOGY | Facility: CLINIC | Age: 64
End: 2019-05-10
Attending: INTERNAL MEDICINE
Payer: COMMERCIAL

## 2019-05-10 DIAGNOSIS — R06.09 DYSPNEA ON EXERTION: ICD-10-CM

## 2019-05-10 RX ADMIN — Medication 6 ML: at 09:30

## 2019-06-27 DIAGNOSIS — I10 BENIGN ESSENTIAL HYPERTENSION: ICD-10-CM

## 2019-06-28 RX ORDER — LOSARTAN POTASSIUM 50 MG/1
50 TABLET ORAL DAILY
Qty: 90 TABLET | Refills: 0 | Status: SHIPPED | OUTPATIENT
Start: 2019-06-28 | End: 2019-09-17

## 2019-06-28 NOTE — TELEPHONE ENCOUNTER
losartan (COZAAR) 50 MG tablet  Last Written Prescription Date:  3/23/19  Last Fill Quantity: 90,   # refills: 0  Last Office Visit : 3/29/19  Future Office visit: none    90 day to pharmacy    Routing refill request to provider for review/approval because: bp > 140/90

## 2019-08-18 NOTE — TELEPHONE ENCOUNTER
losartan (COZAAR) 50 MG tablet  Last Written Prescription Date:  11/10/17  Last Fill Quantity: 90,   # refills: 3  Last Office Visit : 11/10/17  Future Office visit: none    Scheduling has been notified to contact the pt for appointment.    90 day to pharmacy    Routing  Because: creat, potassium past due  
The patient is a 26y Male complaining of wound check.

## 2019-09-16 DIAGNOSIS — I10 BENIGN ESSENTIAL HYPERTENSION: ICD-10-CM

## 2019-09-17 ENCOUNTER — MYC REFILL (OUTPATIENT)
Dept: INTERNAL MEDICINE | Facility: CLINIC | Age: 64
End: 2019-09-17

## 2019-09-17 DIAGNOSIS — T88.7XXA MEDICATION SIDE EFFECTS: ICD-10-CM

## 2019-09-17 DIAGNOSIS — R97.20 ELEVATED PROSTATE SPECIFIC ANTIGEN (PSA): Primary | ICD-10-CM

## 2019-09-17 RX ORDER — LOSARTAN POTASSIUM 50 MG/1
50 TABLET ORAL DAILY
Qty: 90 TABLET | Refills: 0 | Status: SHIPPED | OUTPATIENT
Start: 2019-09-17 | End: 2019-12-14

## 2019-09-17 NOTE — TELEPHONE ENCOUNTER
losartan (COZAAR) 50 MG tablet  Last Written Prescription Date:  6/28/19  Last Fill Quantity: 90,   # refills: 0  Last Office Visit : 3/29/19  Future Office visit:  None    90 day to pharmacy    Routing refill request to provider for review/approval because:  FYI. bp > 140/90

## 2019-10-07 ENCOUNTER — E-VISIT (OUTPATIENT)
Dept: INTERNAL MEDICINE | Facility: CLINIC | Age: 64
End: 2019-10-07

## 2019-10-07 DIAGNOSIS — T88.7XXA MEDICATION SIDE EFFECTS: Primary | ICD-10-CM

## 2019-10-23 DIAGNOSIS — R97.20 ELEVATED PROSTATE SPECIFIC ANTIGEN (PSA): ICD-10-CM

## 2019-10-23 DIAGNOSIS — T88.7XXA MEDICATION SIDE EFFECTS: ICD-10-CM

## 2019-10-23 LAB
CREAT SERPL-MCNC: 0.79 MG/DL (ref 0.66–1.25)
GFR SERPL CREATININE-BSD FRML MDRD: >90 ML/MIN/{1.73_M2}
POTASSIUM SERPL-SCNC: 4.5 MMOL/L (ref 3.4–5.3)
PSA SERPL-ACNC: 3.71 UG/L (ref 0–4)

## 2019-12-13 DIAGNOSIS — I10 BENIGN ESSENTIAL HYPERTENSION: ICD-10-CM

## 2019-12-13 DIAGNOSIS — N40.0 BENIGN PROSTATIC HYPERPLASIA, UNSPECIFIED WHETHER LOWER URINARY TRACT SYMPTOMS PRESENT: ICD-10-CM

## 2019-12-14 RX ORDER — TAMSULOSIN HYDROCHLORIDE 0.4 MG/1
0.4 CAPSULE ORAL DAILY
Qty: 90 CAPSULE | Refills: 0 | Status: SHIPPED | OUTPATIENT
Start: 2019-12-14 | End: 2020-03-16

## 2019-12-14 RX ORDER — LOSARTAN POTASSIUM 50 MG/1
50 TABLET ORAL DAILY
Qty: 90 TABLET | Refills: 0 | Status: SHIPPED | OUTPATIENT
Start: 2019-12-14 | End: 2020-03-16

## 2020-03-16 DIAGNOSIS — N40.0 BENIGN PROSTATIC HYPERPLASIA, UNSPECIFIED WHETHER LOWER URINARY TRACT SYMPTOMS PRESENT: ICD-10-CM

## 2020-03-16 DIAGNOSIS — I10 BENIGN ESSENTIAL HYPERTENSION: ICD-10-CM

## 2020-03-16 RX ORDER — TAMSULOSIN HYDROCHLORIDE 0.4 MG/1
0.4 CAPSULE ORAL DAILY
Qty: 90 CAPSULE | Refills: 0 | Status: SHIPPED | OUTPATIENT
Start: 2020-03-16 | End: 2020-05-14

## 2020-03-16 RX ORDER — LOSARTAN POTASSIUM 50 MG/1
50 TABLET ORAL DAILY
Qty: 90 TABLET | Refills: 0 | Status: SHIPPED | OUTPATIENT
Start: 2020-03-16 | End: 2020-05-14

## 2020-03-16 NOTE — TELEPHONE ENCOUNTER
tamsulosin (FLOMAX) 0.4 MG capsule   Last Written Prescription Date:  12/14/2019  Last Fill Quantity: 90,   # refills: 0  Last Office Visit :  3/29/2019  Future Office visit:  None  90 Tabs, 0 Refills sent to pharm 3/16/2020    losartan (COZAAR) 50 MG tablet   Last Written Prescription Date:  12/14/2019  Last Fill Quantity: 90,   # refills: 0  Last Office Visit : 3/29/2019  Future Office visit:  None  90 Tabs, 0 Refills sent to pharm 3/16/2020    Mari Norman RN  Central Triage Red Flags/Med Refills

## 2020-05-14 DIAGNOSIS — E78.49 OTHER HYPERLIPIDEMIA: ICD-10-CM

## 2020-05-14 DIAGNOSIS — I10 BENIGN ESSENTIAL HYPERTENSION: ICD-10-CM

## 2020-05-14 DIAGNOSIS — N40.0 BENIGN PROSTATIC HYPERPLASIA, UNSPECIFIED WHETHER LOWER URINARY TRACT SYMPTOMS PRESENT: ICD-10-CM

## 2020-05-14 RX ORDER — TAMSULOSIN HYDROCHLORIDE 0.4 MG/1
0.4 CAPSULE ORAL DAILY
Qty: 90 CAPSULE | Refills: 0 | Status: SHIPPED | OUTPATIENT
Start: 2020-05-14

## 2020-05-14 RX ORDER — LOSARTAN POTASSIUM 50 MG/1
50 TABLET ORAL DAILY
Qty: 90 TABLET | Refills: 0 | Status: SHIPPED | OUTPATIENT
Start: 2020-05-14

## 2020-05-14 RX ORDER — HYDROCHLOROTHIAZIDE 25 MG/1
12.5-25 TABLET ORAL DAILY
Qty: 90 TABLET | Refills: 0 | Status: SHIPPED | OUTPATIENT
Start: 2020-05-14

## 2020-05-14 RX ORDER — ATORVASTATIN CALCIUM 20 MG/1
20 TABLET, FILM COATED ORAL DAILY
Qty: 90 TABLET | Refills: 0 | Status: SHIPPED | OUTPATIENT
Start: 2020-05-14

## 2020-05-14 NOTE — TELEPHONE ENCOUNTER
Wife requests refills for  since they are in [place] and unable to return due to Covid-19.  Rxs faxed with suggestion that patient schedule appointment as soon as possible and call if problems arise before that time.

## 2020-07-16 ENCOUNTER — MYC MEDICAL ADVICE (OUTPATIENT)
Dept: INTERNAL MEDICINE | Facility: CLINIC | Age: 65
End: 2020-07-16

## 2020-12-27 ENCOUNTER — HEALTH MAINTENANCE LETTER (OUTPATIENT)
Age: 65
End: 2020-12-27

## 2021-10-09 ENCOUNTER — HEALTH MAINTENANCE LETTER (OUTPATIENT)
Age: 66
End: 2021-10-09

## 2022-01-29 ENCOUNTER — HEALTH MAINTENANCE LETTER (OUTPATIENT)
Age: 67
End: 2022-01-29

## 2022-09-17 ENCOUNTER — HEALTH MAINTENANCE LETTER (OUTPATIENT)
Age: 67
End: 2022-09-17

## 2023-05-06 ENCOUNTER — HEALTH MAINTENANCE LETTER (OUTPATIENT)
Age: 68
End: 2023-05-06

## 2023-07-07 NOTE — MR AVS SNAPSHOT
After Visit Summary   11/10/2017    Driss Dillon    MRN: [id number]           Patient Information     Date Of Birth          1955        Visit Information        Provider Department      11/10/2017 8:00 AM Elen Hensley MD [facility] Dermatology        Care Instructions    Wound Care After a Biopsy    What is a skin biopsy?  A skin biopsy allows the doctor to examine a very small piece of tissue under the microscope to determine the diagnosis and the best treatment for the skin condition. A local anesthetic (numbing medicine)  is injected with a very small needle into the skin area to be tested. A small piece of skin is taken from the area. Sometimes a suture (stitch) is used.     What are the risks of a skin biopsy?  I will experience scar, bleeding, swelling, pain, crusting and redness. I may experience incomplete removal or recurrence. Risks of this procedure are excessive bleeding, bruising, infection, nerve damage, numbness, thick (hypertrophic or keloidal) scar and non-diagnostic biopsy.    How should I care for my wound for the first 24 hours?    Keep the wound dry and covered for 24 hours    If it bleeds, hold direct pressure on the area for 15 minutes. If bleeding does not stop then go to the emergency room    Avoid strenuous exercise the first 1-2 days or as your doctor instructs you    How should I care for the wound after 24 hours?    After 24 hours, remove the bandage    You may bathe or shower as normal    If you had a scalp biopsy, you can shampoo as usual and can use shower water to clean the biopsy site daily    Clean the wound twice a day with gentle soap and water    Do not scrub, be gentle    Apply white petroleum/Vaseline after cleaning the wound with a cotton swab or a clean finger, and keep the site covered with a Bandaid /bandage. Bandages are not necessary with a scalp biopsy    If you are unable to cover the site with a Bandaid /bandage, re-apply ointment 2-3 times a  SABRINA Progress Note      Notes:  SW informed by Dr. Melvin patient is refusing to be admitted to [facility]. Patient will return to [place] ED.  covering ED with updated.    day to keep the site moist. Moisture will help with healing    Avoid strenuous activity for first 1-2 days    Avoid lakes, rivers, pools, and oceans until the stitches are removed or the site is healed    How do I clean my wound?    Wash hands thoroughly with soap or use hand  before all wound care    Clean the wound with gentle soap and water    Apply white petroleum/Vaseline  to wound after it is clean    Replace the Bandaid /bandage to keep the wound covered for the first few days or as instructed by your doctor    If you had a scalp biopsy, warm shower water to the area on a daily basis should suffice    What should I use to clean my wound?     Cotton-tipped applicators (Qtips )    White petroleum jelly (Vaseline ). Use a clean new container and use Q-tips to apply.    Bandaids   as needed    Gentle soap     How should I care for my wound long term?    Do not get your wound dirty    Keep up with wound care for one week or until the area is healed.    A small scab will form and fall off by itself when the area is completely healed. The area will be red and will become pink in color as it heals. Sun protection is very important for how your scar will turn out. Sunscreen with an SPF 30 or greater is recommended once the area is healed.    If you have stitches, stitches need to be removed in 14 days. You may return to our clinic for this or you may have it done locally at your doctor s office.    You should have some soreness but it should be mild and slowly go away over several days. Talk to your doctor about using tylenol for pain,    When should I call my doctor?  If you have increased:     Pain or swelling    Pus or drainage (clear or slightly yellow drainage is ok)    Temperature over 100F    Spreading redness or warmth around wound    When will I hear about my results?  The biopsy results can take 2-3 weeks to come back. The clinic will call you with the results, send you a [email or web address] message, or have  you schedule a follow-up clinic or phone time to discuss the results. Contact our clinics if you do not hear from us in 3 weeks.     Who should I call with questions?    [facility]: [phone]     [facility]: [phone]    For urgent needs outside of business hours call the [facility] at [phone] and ask for the dermatology resident on call              Follow-ups after your visit        Follow-up notes from your care team     Return in about 6 months (around 5/10/2018).      Your next 10 appointments already scheduled     Nov 10, 2017  9:30 AM CST   DX HIP/PELVIS/SPINE with DX1   [facility] ([facility])    [address] [place] [address]  1st [facility] [address] [phone]           Please do not take any of the following 24 hours prior to the day of your exam: vitamins, calcium tablets, antacids.  If possible, please wear clothes without metal (snaps, zippers). A sweatsuit works well.            Nov 10, 2017 10:00 AM CST   (Arrive by 9:45 AM)   Fitness Evaluation with  [employer]   [facility] ([facility])    [address] [place] [address]  5th Floor  [facility] [address]   [phone]            Nov 10, 2017 12:00 PM CST   NUTRITION VISIT with Andie Malik RD   [facility] ([facility])    [address] [place] [address]  5th [facility] [address]   [phone]            Nov 10, 2017  1:00 PM CST   PFT VISIT with  PFL JUSTYNA   [facility] ([facility])    [address] [place] [address]  3rd Floor  [facility] [address]   [phone]            Nov 10, 2017  1:30 PM CST   (Arrive by 1:15 PM)   Signature Base Assessment with Belkys Santoro   [facility] ([facility])    [address] [place] [address]  Floor  [facility] [id number] [phone]            Nov 10, 2017  2:00 PM CST   (Arrive by 1:45 PM)   Signature Base Assessment with Dylan Fragoso MD   [facility] ([facility])    [address] [facility]  5th [facility] [id number] [phone]              Future tests that were ordered for you today     Open Future Orders        Priority Expected Expires Ordered    EKG 12-lead, tracing only Routine  11/3/2018 11/3/2017            Who to contact     Please call your clinic at [phone] to:    Ask questions about your health    Make or cancel appointments    Discuss your medicines    Learn about your test results    Speak to your doctor   If you have compliments or concerns about an experience at your clinic, or if you wish to file a complaint, please contact [facility] Patient Relations at [phone] or email us at [email or web address]         Additional Information About Your Visit        [facility] [employer] Information     [employer] gives you secure access to your electronic health record. If you see a primary care provider, you can also send messages to your care team and make appointments. If you have questions, please call your primary care clinic.  If you do not have a primary care provider, please call [phone] and they will assist you.      [employer] is an electronic gateway that provides easy, online access to your medical records. With [employer], you can request a clinic appointment, read your test results, renew a prescription or communicate with your care team.     To access your existing account, please contact your [facility] or call [phone] for assistance.        Care EveryWhere ID     This is your Care EveryWhere ID. This could be used by other organizations to access your [place] medical records  [id number]         Blood Pressure from Last 3 Encounters:    No data found for BP    Weight from Last 3 Encounters:   No data found for Wt              Today, you had the following     No orders found for display       Primary Care Provider    None Specified       No primary provider on file.        Equal Access to Services     TAYLOR SHARPE : Hadii aad ku hadidaniatravis Haas, alexaanabell mixonfabrizioha, savita thomas, arturo jeremiahin hayaabobby kendrickcarlos carrillo lastevebobby medina. So [facility] [phone].    ATENCIÓN: Si habla español, tiene a khoury disposición servicios gratuitos de asistencia lingüística. Llame al [phone].    We comply with applicable federal civil rights laws and [place] laws. We do not discriminate on the basis of race, color, national origin, age, disability, sex, sexual orientation, or gender identity.            Thank you!     Thank you for choosing [facility]  for your care. Our goal is always to provide you with excellent care. Hearing back from our patients is one way we can continue to improve our services. Please take a few minutes to complete the written survey that you may receive in the mail after your visit with us. Thank you!             Your Updated Medication List - Protect others around you: Learn how to safely use, store and throw away your medicines at www.disposemymeds.org.          This list is accurate as of: 11/10/17  8:43 AM.  Always use your most recent med list.                   Brand Name Dispense Instructions for use Diagnosis    ALEVE 220 MG capsule   Generic drug:  naproxen sodium      Take 220 mg by mouth 2 times daily (with meals)        aspirin 81 MG tablet      Take 81 mg by mouth daily        atorvastatin 10 MG tablet    LIPITOR     Take 10 mg by mouth daily        losartan 50 MG tablet    COZAAR     Take 50 mg by mouth daily        Multi-vitamin Tabs tablet      Take 1 tablet by mouth daily        tamsulosin 80 mcg/mL Susp    FLOMAX     Take by mouth daily

## (undated) DEVICE — ENDO CAP AND TUBING STERILE FOR ENDOGATOR  100130

## (undated) DEVICE — ENDO CONNECTOR ENDOGATOR AUX WATER JET FOR OLYMPUS SCOPE

## (undated) DEVICE — TAPE DURAPORE 3" SILK 1538-3

## (undated) DEVICE — SOL WATER IRRIG 1000ML BOTTLE 2F7114

## (undated) DEVICE — SPECIMEN CONTAINER 3OZ W/FORMALIN 59901

## (undated) DEVICE — SUCTION MANIFOLD NEPTUNE 2 SYS 4 PORT 0702-020-000

## (undated) DEVICE — ENDO FORCEP ENDOJAW BIOPSY 2.8MMX230CM FB-220U

## (undated) DEVICE — WIPE PREMOIST CLEANSING WASHCLOTHS 7988

## (undated) RX ORDER — LIDOCAINE HYDROCHLORIDE 10 MG/ML
INJECTION, SOLUTION INFILTRATION; PERINEURAL
Status: DISPENSED
Start: 2018-02-27

## (undated) RX ORDER — FENTANYL CITRATE 50 UG/ML
INJECTION, SOLUTION INTRAMUSCULAR; INTRAVENOUS
Status: DISPENSED
Start: 2019-05-07

## (undated) RX ORDER — GENTAMICIN 40 MG/ML
INJECTION, SOLUTION INTRAMUSCULAR; INTRAVENOUS
Status: DISPENSED
Start: 2018-02-27